# Patient Record
Sex: FEMALE | Race: AMERICAN INDIAN OR ALASKA NATIVE | NOT HISPANIC OR LATINO | Employment: FULL TIME | URBAN - METROPOLITAN AREA
[De-identification: names, ages, dates, MRNs, and addresses within clinical notes are randomized per-mention and may not be internally consistent; named-entity substitution may affect disease eponyms.]

---

## 2022-02-10 ENCOUNTER — OFFICE VISIT (OUTPATIENT)
Dept: INTERNAL MEDICINE CLINIC | Facility: CLINIC | Age: 42
End: 2022-02-10
Payer: COMMERCIAL

## 2022-02-10 VITALS
HEART RATE: 80 BPM | TEMPERATURE: 97.8 F | BODY MASS INDEX: 23.45 KG/M2 | WEIGHT: 127.4 LBS | HEIGHT: 62 IN | RESPIRATION RATE: 18 BRPM | SYSTOLIC BLOOD PRESSURE: 112 MMHG | DIASTOLIC BLOOD PRESSURE: 66 MMHG | OXYGEN SATURATION: 100 %

## 2022-02-10 DIAGNOSIS — E03.9 HYPOTHYROIDISM, UNSPECIFIED TYPE: ICD-10-CM

## 2022-02-10 DIAGNOSIS — N92.0 MENORRHAGIA WITH REGULAR CYCLE: Primary | ICD-10-CM

## 2022-02-10 DIAGNOSIS — D50.0 IRON DEFICIENCY ANEMIA DUE TO CHRONIC BLOOD LOSS: ICD-10-CM

## 2022-02-10 DIAGNOSIS — Z00.00 ROUTINE GENERAL MEDICAL EXAMINATION AT A HEALTH CARE FACILITY: ICD-10-CM

## 2022-02-10 DIAGNOSIS — E53.8 B12 DEFICIENCY: ICD-10-CM

## 2022-02-10 PROCEDURE — 3725F SCREEN DEPRESSION PERFORMED: CPT | Performed by: INTERNAL MEDICINE

## 2022-02-10 PROCEDURE — 1036F TOBACCO NON-USER: CPT | Performed by: INTERNAL MEDICINE

## 2022-02-10 PROCEDURE — 99385 PREV VISIT NEW AGE 18-39: CPT | Performed by: INTERNAL MEDICINE

## 2022-02-10 PROCEDURE — 3008F BODY MASS INDEX DOCD: CPT | Performed by: INTERNAL MEDICINE

## 2022-02-10 RX ORDER — DOXYCYCLINE HYCLATE 50 MG/1
324 CAPSULE, GELATIN COATED ORAL
COMMUNITY

## 2022-02-10 RX ORDER — LEVOTHYROXINE SODIUM 0.07 MG/1
75 TABLET ORAL DAILY
COMMUNITY
Start: 2021-12-12 | End: 2022-02-10 | Stop reason: SDUPTHER

## 2022-02-10 RX ORDER — CYANOCOBALAMIN 1000 UG/ML
INJECTION INTRAMUSCULAR; SUBCUTANEOUS
COMMUNITY
Start: 2021-12-21

## 2022-02-10 RX ORDER — LEVOTHYROXINE SODIUM 0.07 MG/1
75 TABLET ORAL DAILY
Qty: 90 TABLET | Refills: 1 | Status: SHIPPED | OUTPATIENT
Start: 2022-02-10

## 2022-02-10 NOTE — PROGRESS NOTES
INTERNAL MEDICINE OFFICE VISIT  [facility] Internal Medicine- [place]    NAME: Jolene Menjivar  AGE: 46 y o  SEX: female    DATE OF ENCOUNTER: 2/10/2022    Assessment and Giovani Bojorquez 14 comes in today to establish care/for annual physical     1  Menorrhagia with regular cycle  -endorses heavy menstruation, lasts 4-5 days, she frequently has to change pads  Her periods are regular  -she has followed with gynecology previously  She had a transvaginal ultrasound completed in January of 2020 which showed endometrial lesions, likely polyps rather than fibroids  There is also evidence of a left adnexal mass likely being a fibroid  -she is not currently on any oral contraceptives  -will refer to Gynecology    - Ambulatory Referral to Gynecology; Future    2  B12 deficiency  -likely due to dietary insufficiency  Had very low B12 levels last year and was started on injections  She is a vegetarian  She has been trying to incorporate more sources of B12 into her diet  -she has been receiving monthly B12 injections  -recheck B12 level  - Vitamin B12; Future    3  Iron deficiency anemia due to chronic blood loss  -likely secondary to chronic blood loss from menorrhagia as above  She states she had prior evaluation for celiac disease and thalassemia which was unremarkable  -Ferritin as low as 2 5 in April of 2021, improved to 28 4 in June after iron infusion  -she remains on iron supplementation  Takes ferrous gluconate daily  No issues tolerating this  -will recheck iron levels    - CBC and differential; Future  - Comprehensive metabolic panel; Future  - Lipid panel; Future  - Iron Panel (Includes Ferritin, Iron Sat%, Iron, and TIBC); Future    4  Hypothyroidism, unspecified type  -this was diagnosed during pregnancy  Her lab values have been stable on current dose of levothyroxine  -continue levothyroxine  Recheck TSH    - TSH, 3rd generation; Future  - T4, free; Future  - Vitamin D 25 hydroxy;  Future  - levothyroxine 75 mcg tablet; Take 1 tablet (75 mcg total) by mouth daily  Dispense: 90 tablet; Refill: 1     Hep C testing was negative in June of 2021, she also previously states she had HIV screening    Up-to-date on flu shot    's license paperwork is completed and signed today  No medical issues that would preclude her from driving      Depression Screening and Follow-up Plan: Patient was screened for depression during today's encounter  They screened negative with a PHQ-2 score of 0  Orders Placed This Encounter   Procedures    Vitamin B12    TSH, 3rd generation    T4, free    CBC and differential    Comprehensive metabolic panel    Lipid panel    Vitamin D 25 hydroxy    Ambulatory Referral to Gynecology       Chief Complaint     Chief Complaint   Patient presents with    [facility]     hep C /HIV/cervical cancer  screening /       History of Present Illness     She has a medical history of iron deficiency anemia, vitamin B12 deficiency, hypothyroidism    She is originally from [place], moved to [place] for 7 years  Her  works for [employer] and the family recently relocated to [facility] for his job  She has a 8year-old daughter who is doing well    Family history is remarkable for type 2 diabetes, cardiac bypass procedure in her father, hypothyroidism, hypertension in her mother  She has a 43-year-old sister who is in good health    She is a nonsmoker    Occasionally drinks red wine    The following portions of the patient's history were reviewed and updated as appropriate: allergies, current medications, past family history, past medical history, past social history, past surgical history and problem list     Review of Systems     10 point ROS negative except per HPI    Active Problem List     Patient Active Problem List   Diagnosis    Menorrhagia with regular cycle    B12 deficiency    Iron deficiency anemia due to chronic blood loss    Hypothyroidism       Objective     BP 112/66 (BP Location: Left arm, Patient Position: Sitting, Cuff Size: Standard)   Pulse 80   Temp 97 8 °F (36 6 °C)   Resp 18   Ht 5' 2" (1 575 m)   Wt 57 8 kg (127 lb 6 4 oz)   SpO2 100%   BMI 23 30 kg/m²     Physical Exam  Constitutional:       Appearance: Normal appearance  She is not ill-appearing  HENT:      Head: Normocephalic and atraumatic  Eyes:      General: No scleral icterus  Right eye: No discharge  Left eye: No discharge  Cardiovascular:      Rate and Rhythm: Normal rate and regular rhythm  Heart sounds: No murmur heard  No friction rub  Pulmonary:      Effort: Pulmonary effort is normal       Breath sounds: Normal breath sounds  No wheezing or rales  Abdominal:      General: Abdomen is flat  There is no distension  Palpations: Abdomen is soft  Tenderness: There is no abdominal tenderness  Musculoskeletal:         General: No swelling or tenderness  Skin:     General: Skin is warm and dry  Findings: No erythema  Neurological:      Mental Status: She is alert  Mental status is at baseline  Motor: No weakness  Psychiatric:         Mood and Affect: Mood normal          Behavior: Behavior normal          Pertinent Laboratory/Diagnostic Studies:  Patient was never admitted      Images and diagnostics reviewed     Current Medications     Current Outpatient Medications:     cyanocobalamin 1,000 mcg/mL, , Disp: , Rfl:     ferrous gluconate (FERGON) 324 mg tablet, Take 324 mg by mouth daily with breakfast, Disp: , Rfl:     levothyroxine 75 mcg tablet, Take 1 tablet (75 mcg total) by mouth daily, Disp: 90 tablet, Rfl: 1    Health Maintenance     Health Maintenance   Topic Date Due    Hepatitis C Screening  Never done    HIV Screening  Never done    Annual Physical  Never done    Cervical Cancer Screening  Never done    Influenza Vaccine (1) 09/01/2021    Depression Screening  02/10/2023    BMI: Adult  02/10/2023    DTaP,Tdap,and Td Vaccines (2 - Td or Tdap) 10/24/2026    COVID-19 Vaccine  Completed    Pneumococcal Vaccine: Pediatrics (0 to 5 Years) and At-Risk Patients (6 to 59 Years)  Aged Out    HIB Vaccine  Aged Out    Hepatitis B Vaccine  Aged Out    IPV Vaccine  Aged Out    Hepatitis A Vaccine  Aged Out    Meningococcal ACWY Vaccine  Aged Out    HPV Vaccine  Aged [employer] History   Administered Date(s) Administered    COVID-19 PFIZER VACCINE 0 3 ML IM 04/15/2021, 05/06/2021, 12/08/2021    INFLUENZA 10/29/2020    Influenza Quadrivalent 3 years and older 11/02/2016    Influenza Quadrivalent Preservative Free 3 years and older IM 11/22/2017, 11/07/2018, 11/12/2019    MMR 11/04/2016, 12/21/2016    Tdap 10/24/2016    Tuberculin Skin Test-PPD Intradermal 10/24/2016, 10/22/2018       MEL Petty  Internal Medicine [place] [id number], [facility] [address]  [place], [address]   Office: [phone]

## 2022-04-07 ENCOUNTER — OCCMED (OUTPATIENT)
Dept: URGENT CARE | Facility: CLINIC | Age: 42
End: 2022-04-07
Payer: COMMERCIAL

## 2022-04-07 ENCOUNTER — APPOINTMENT (OUTPATIENT)
Dept: LAB | Facility: CLINIC | Age: 42
End: 2022-04-07
Payer: COMMERCIAL

## 2022-04-07 DIAGNOSIS — Z02.1 PRE-EMPLOYMENT HEALTH SCREENING EXAMINATION: ICD-10-CM

## 2022-04-07 DIAGNOSIS — Z02.1 PRE-EMPLOYMENT HEALTH SCREENING EXAMINATION: Primary | ICD-10-CM

## 2022-04-07 LAB — RUBV IGG SERPL IA-ACNC: >175 IU/ML

## 2022-04-07 PROCEDURE — 86735 MUMPS ANTIBODY: CPT

## 2022-04-07 PROCEDURE — 86480 TB TEST CELL IMMUN MEASURE: CPT

## 2022-04-07 PROCEDURE — 86762 RUBELLA ANTIBODY: CPT

## 2022-04-07 PROCEDURE — 86787 VARICELLA-ZOSTER ANTIBODY: CPT

## 2022-04-07 PROCEDURE — 36415 COLL VENOUS BLD VENIPUNCTURE: CPT

## 2022-04-07 PROCEDURE — 86765 RUBEOLA ANTIBODY: CPT

## 2022-04-09 LAB — VZV IGG SER IA-ACNC: NORMAL

## 2022-04-10 LAB
GAMMA INTERFERON BACKGROUND BLD IA-ACNC: 0.04 IU/ML
M TB IFN-G BLD-IMP: NEGATIVE
M TB IFN-G CD4+ BCKGRND COR BLD-ACNC: -0.01 IU/ML
M TB IFN-G CD4+ BCKGRND COR BLD-ACNC: -0.02 IU/ML
MEV IGG SER QL: NORMAL
MITOGEN IGNF BCKGRD COR BLD-ACNC: 7.6 IU/ML
MUV IGG SER QL: NORMAL

## 2022-06-03 ENCOUNTER — APPOINTMENT (OUTPATIENT)
Dept: LAB | Facility: CLINIC | Age: 42
End: 2022-06-03
Payer: COMMERCIAL

## 2022-06-03 DIAGNOSIS — E53.8 B12 DEFICIENCY: ICD-10-CM

## 2022-06-03 DIAGNOSIS — E03.9 HYPOTHYROIDISM, UNSPECIFIED TYPE: ICD-10-CM

## 2022-06-03 DIAGNOSIS — D50.0 IRON DEFICIENCY ANEMIA DUE TO CHRONIC BLOOD LOSS: ICD-10-CM

## 2022-06-03 DIAGNOSIS — Z00.8 HEALTH EXAMINATION IN POPULATION SURVEY: ICD-10-CM

## 2022-06-03 LAB
25(OH)D3 SERPL-MCNC: 18.8 NG/ML (ref 30–100)
ALBUMIN SERPL BCP-MCNC: 4.1 G/DL (ref 3.5–5)
ALP SERPL-CCNC: 51 U/L (ref 46–116)
ALT SERPL W P-5'-P-CCNC: 21 U/L (ref 12–78)
ANION GAP SERPL CALCULATED.3IONS-SCNC: 8 MMOL/L (ref 4–13)
AST SERPL W P-5'-P-CCNC: 17 U/L (ref 5–45)
BASOPHILS # BLD AUTO: 0.02 THOUSANDS/ÂΜL (ref 0–0.1)
BASOPHILS NFR BLD AUTO: 0 % (ref 0–1)
BILIRUB SERPL-MCNC: 0.41 MG/DL (ref 0.2–1)
BUN SERPL-MCNC: 11 MG/DL (ref 5–25)
CALCIUM SERPL-MCNC: 9.4 MG/DL (ref 8.3–10.1)
CHLORIDE SERPL-SCNC: 107 MMOL/L (ref 100–108)
CHOLEST SERPL-MCNC: 185 MG/DL
CO2 SERPL-SCNC: 22 MMOL/L (ref 21–32)
CREAT SERPL-MCNC: 0.64 MG/DL (ref 0.6–1.3)
EOSINOPHIL # BLD AUTO: 0.14 THOUSAND/ÂΜL (ref 0–0.61)
EOSINOPHIL NFR BLD AUTO: 3 % (ref 0–6)
ERYTHROCYTE [DISTWIDTH] IN BLOOD BY AUTOMATED COUNT: 13.2 % (ref 11.6–15.1)
EST. AVERAGE GLUCOSE BLD GHB EST-MCNC: 91 MG/DL
FERRITIN SERPL-MCNC: 9 NG/ML (ref 8–388)
GFR SERPL CREATININE-BSD FRML MDRD: 112 ML/MIN/1.73SQ M
GLUCOSE P FAST SERPL-MCNC: 81 MG/DL (ref 65–99)
HBA1C MFR BLD: 4.8 %
HCT VFR BLD AUTO: 40.6 % (ref 34.8–46.1)
HDLC SERPL-MCNC: 65 MG/DL
HGB BLD-MCNC: 12.8 G/DL (ref 11.5–15.4)
IMM GRANULOCYTES # BLD AUTO: 0.01 THOUSAND/UL (ref 0–0.2)
IMM GRANULOCYTES NFR BLD AUTO: 0 % (ref 0–2)
IRON SATN MFR SERPL: 14 % (ref 15–50)
IRON SERPL-MCNC: 55 UG/DL (ref 50–170)
LDLC SERPL CALC-MCNC: 110 MG/DL (ref 0–100)
LYMPHOCYTES # BLD AUTO: 1.07 THOUSANDS/ÂΜL (ref 0.6–4.47)
LYMPHOCYTES NFR BLD AUTO: 22 % (ref 14–44)
MCH RBC QN AUTO: 28.1 PG (ref 26.8–34.3)
MCHC RBC AUTO-ENTMCNC: 31.5 G/DL (ref 31.4–37.4)
MCV RBC AUTO: 89 FL (ref 82–98)
MONOCYTES # BLD AUTO: 0.26 THOUSAND/ÂΜL (ref 0.17–1.22)
MONOCYTES NFR BLD AUTO: 5 % (ref 4–12)
NEUTROPHILS # BLD AUTO: 3.36 THOUSANDS/ÂΜL (ref 1.85–7.62)
NEUTS SEG NFR BLD AUTO: 70 % (ref 43–75)
NONHDLC SERPL-MCNC: 120 MG/DL
NRBC BLD AUTO-RTO: 0 /100 WBCS
PLATELET # BLD AUTO: 256 THOUSANDS/UL (ref 149–390)
PMV BLD AUTO: 11.2 FL (ref 8.9–12.7)
POTASSIUM SERPL-SCNC: 4.2 MMOL/L (ref 3.5–5.3)
PROT SERPL-MCNC: 7.8 G/DL (ref 6.4–8.2)
RBC # BLD AUTO: 4.56 MILLION/UL (ref 3.81–5.12)
SODIUM SERPL-SCNC: 137 MMOL/L (ref 136–145)
T4 FREE SERPL-MCNC: 1.1 NG/DL (ref 0.76–1.46)
TIBC SERPL-MCNC: 387 UG/DL (ref 250–450)
TRIGL SERPL-MCNC: 51 MG/DL
TSH SERPL DL<=0.05 MIU/L-ACNC: 1.13 UIU/ML (ref 0.45–4.5)
VIT B12 SERPL-MCNC: 449 PG/ML (ref 100–900)
WBC # BLD AUTO: 4.86 THOUSAND/UL (ref 4.31–10.16)

## 2022-06-03 PROCEDURE — 85025 COMPLETE CBC W/AUTO DIFF WBC: CPT

## 2022-06-03 PROCEDURE — 36415 COLL VENOUS BLD VENIPUNCTURE: CPT

## 2022-06-03 PROCEDURE — 82728 ASSAY OF FERRITIN: CPT

## 2022-06-03 PROCEDURE — 84439 ASSAY OF FREE THYROXINE: CPT

## 2022-06-03 PROCEDURE — 80053 COMPREHEN METABOLIC PANEL: CPT

## 2022-06-03 PROCEDURE — 82306 VITAMIN D 25 HYDROXY: CPT

## 2022-06-03 PROCEDURE — 83036 HEMOGLOBIN GLYCOSYLATED A1C: CPT

## 2022-06-03 PROCEDURE — 83540 ASSAY OF IRON: CPT

## 2022-06-03 PROCEDURE — 84443 ASSAY THYROID STIM HORMONE: CPT

## 2022-06-03 PROCEDURE — 80061 LIPID PANEL: CPT

## 2022-06-03 PROCEDURE — 83550 IRON BINDING TEST: CPT

## 2022-06-03 PROCEDURE — 82607 VITAMIN B-12: CPT

## 2022-08-14 ENCOUNTER — PATIENT MESSAGE (OUTPATIENT)
Dept: INTERNAL MEDICINE CLINIC | Facility: CLINIC | Age: 42
End: 2022-08-14

## 2022-08-14 DIAGNOSIS — D50.0 IRON DEFICIENCY ANEMIA DUE TO CHRONIC BLOOD LOSS: Primary | ICD-10-CM

## 2022-08-15 RX ORDER — SODIUM CHLORIDE 9 MG/ML
20 INJECTION, SOLUTION INTRAVENOUS ONCE
Status: CANCELLED | OUTPATIENT
Start: 2022-08-15

## 2022-08-16 NOTE — TELEPHONE ENCOUNTER
From: Gabby Ribeiro  To: Marco Small, DO  Sent: 8/14/2022 9:53 PM EDT  Subject: Regarding [address],     Massiel Cifuentes have suggested me to go for iron infusion based on my test results  Can you please put in the referral for the same, so that I can book my appointments       Thank you  [employer]

## 2022-08-24 ENCOUNTER — PATIENT MESSAGE (OUTPATIENT)
Dept: INTERNAL MEDICINE CLINIC | Facility: CLINIC | Age: 42
End: 2022-08-24

## 2022-08-24 DIAGNOSIS — D50.0 IRON DEFICIENCY ANEMIA DUE TO CHRONIC BLOOD LOSS: Primary | ICD-10-CM

## 2022-08-25 RX ORDER — SODIUM CHLORIDE 9 MG/ML
20 INJECTION, SOLUTION INTRAVENOUS ONCE
Status: CANCELLED | OUTPATIENT
Start: 2022-08-25

## 2022-08-25 NOTE — TELEPHONE ENCOUNTER
From: Joesph Nicholson  To: Marco Clark, DO  Sent: 8/14/2022 9:53 PM EDT  Subject: Regarding [address] have suggested me to go for iron infusion based on my test results  Can you please put in the referral for the same, so that I can book my appointments       Thank you  [employer]

## 2022-09-19 DIAGNOSIS — D50.0 IRON DEFICIENCY ANEMIA DUE TO CHRONIC BLOOD LOSS: Primary | ICD-10-CM

## 2022-09-19 RX ORDER — SODIUM CHLORIDE 9 MG/ML
20 INJECTION, SOLUTION INTRAVENOUS ONCE
Status: CANCELLED | OUTPATIENT
Start: 2022-09-21

## 2022-09-20 ENCOUNTER — TELEPHONE (OUTPATIENT)
Dept: INTERNAL MEDICINE CLINIC | Facility: CLINIC | Age: 42
End: 2022-09-20

## 2022-09-20 NOTE — TELEPHONE ENCOUNTER
Called pt left message to call back and to cancel her appt for infusion it has not been approved so we need to reschedule this appt        Spoke to Reaction from pre encounter told her that we are waiting for approval of the infusion

## 2022-09-21 ENCOUNTER — HOSPITAL ENCOUNTER (OUTPATIENT)
Dept: INFUSION CENTER | Facility: HOSPITAL | Age: 42
Discharge: HOME | End: 2022-09-21

## 2022-09-22 DIAGNOSIS — D50.0 IRON DEFICIENCY ANEMIA DUE TO CHRONIC BLOOD LOSS: Primary | ICD-10-CM

## 2022-09-22 RX ORDER — SODIUM CHLORIDE 9 MG/ML
20 INJECTION, SOLUTION INTRAVENOUS ONCE
Status: CANCELLED | OUTPATIENT
Start: 2022-10-05

## 2022-09-27 ENCOUNTER — TELEPHONE (OUTPATIENT)
Dept: INTERNAL MEDICINE CLINIC | Facility: CLINIC | Age: 42
End: 2022-09-27

## 2022-09-27 NOTE — TELEPHONE ENCOUNTER
FOR PRIOR AUTH [employer] INSURANCE:    Tel- [phone] opt #[phone]    Fax# [phone]    INFUSION CENTER:   [facility] CPT: Jumana Velazquez CPT:

## 2022-09-28 ENCOUNTER — HOSPITAL ENCOUNTER (OUTPATIENT)
Dept: INFUSION CENTER | Facility: HOSPITAL | Age: 42
End: 2022-09-28

## 2022-10-05 ENCOUNTER — HOSPITAL ENCOUNTER (OUTPATIENT)
Dept: INFUSION CENTER | Facility: HOSPITAL | Age: 42
Discharge: HOME | End: 2022-10-05
Payer: COMMERCIAL

## 2022-10-05 VITALS — TEMPERATURE: 96.8 F | RESPIRATION RATE: 12 BRPM | HEART RATE: 72 BPM | OXYGEN SATURATION: 100 %

## 2022-10-05 DIAGNOSIS — D50.0 IRON DEFICIENCY ANEMIA DUE TO CHRONIC BLOOD LOSS: Primary | ICD-10-CM

## 2022-10-05 PROCEDURE — 96365 THER/PROPH/DIAG IV INF INIT: CPT

## 2022-10-05 RX ORDER — SODIUM CHLORIDE 9 MG/ML
20 INJECTION, SOLUTION INTRAVENOUS ONCE
Status: CANCELLED | OUTPATIENT
Start: 2022-10-12

## 2022-10-05 RX ORDER — SODIUM CHLORIDE 9 MG/ML
20 INJECTION, SOLUTION INTRAVENOUS ONCE
Status: COMPLETED | OUTPATIENT
Start: 2022-10-05 | End: 2022-10-05

## 2022-10-05 RX ADMIN — IRON SUCROSE 200 MG: 20 INJECTION, SOLUTION INTRAVENOUS at 14:51

## 2022-10-05 RX ADMIN — SODIUM CHLORIDE 20 ML/HR: 9 INJECTION, SOLUTION INTRAVENOUS at 14:53

## 2022-10-05 NOTE — PROGRESS NOTES
Pt tolerated IV Venofer infusion without adverse reaction  Pt left unit ambulatory with steady gait    Pt refused AVS

## 2022-10-12 ENCOUNTER — HOSPITAL ENCOUNTER (OUTPATIENT)
Dept: INFUSION CENTER | Facility: HOSPITAL | Age: 42
Discharge: HOME | End: 2022-10-12
Payer: COMMERCIAL

## 2022-10-12 VITALS
TEMPERATURE: 97.2 F | RESPIRATION RATE: 14 BRPM | SYSTOLIC BLOOD PRESSURE: 107 MMHG | HEART RATE: 65 BPM | DIASTOLIC BLOOD PRESSURE: 65 MMHG | OXYGEN SATURATION: 100 %

## 2022-10-12 DIAGNOSIS — D50.0 IRON DEFICIENCY ANEMIA DUE TO CHRONIC BLOOD LOSS: Primary | ICD-10-CM

## 2022-10-12 PROCEDURE — 96365 THER/PROPH/DIAG IV INF INIT: CPT

## 2022-10-12 RX ORDER — SODIUM CHLORIDE 9 MG/ML
20 INJECTION, SOLUTION INTRAVENOUS ONCE
Status: COMPLETED | OUTPATIENT
Start: 2022-10-12 | End: 2022-10-12

## 2022-10-12 RX ORDER — SODIUM CHLORIDE 9 MG/ML
20 INJECTION, SOLUTION INTRAVENOUS ONCE
Status: CANCELLED | OUTPATIENT
Start: 2022-10-19

## 2022-10-12 RX ADMIN — SODIUM CHLORIDE 20 ML/HR: 9 INJECTION, SOLUTION INTRAVENOUS at 10:25

## 2022-10-12 RX ADMIN — IRON SUCROSE 200 MG: 20 INJECTION, SOLUTION INTRAVENOUS at 10:30

## 2022-10-12 NOTE — PROGRESS NOTES
Pt [place] Venofer infusion w/o Ar seen  PIV removed intact, pressure dssg applied  Pt has one more appt  Disch amb to home with spouse, steady gait

## 2022-10-20 ENCOUNTER — HOSPITAL ENCOUNTER (OUTPATIENT)
Dept: INFUSION CENTER | Facility: HOSPITAL | Age: 42
Discharge: HOME | End: 2022-10-20
Payer: COMMERCIAL

## 2022-10-20 VITALS
OXYGEN SATURATION: 100 % | SYSTOLIC BLOOD PRESSURE: 100 MMHG | RESPIRATION RATE: 14 BRPM | TEMPERATURE: 97.3 F | HEART RATE: 68 BPM | DIASTOLIC BLOOD PRESSURE: 59 MMHG

## 2022-10-20 DIAGNOSIS — D50.0 IRON DEFICIENCY ANEMIA DUE TO CHRONIC BLOOD LOSS: Primary | ICD-10-CM

## 2022-10-20 PROCEDURE — 96365 THER/PROPH/DIAG IV INF INIT: CPT

## 2022-10-20 RX ORDER — SODIUM CHLORIDE 9 MG/ML
20 INJECTION, SOLUTION INTRAVENOUS ONCE
Status: COMPLETED | OUTPATIENT
Start: 2022-10-20 | End: 2022-10-20

## 2022-10-20 RX ORDER — SODIUM CHLORIDE 9 MG/ML
20 INJECTION, SOLUTION INTRAVENOUS ONCE
Status: CANCELLED | OUTPATIENT
Start: 2022-10-20

## 2022-10-20 RX ADMIN — SODIUM CHLORIDE 20 ML/HR: 9 INJECTION, SOLUTION INTRAVENOUS at 09:31

## 2022-10-20 RX ADMIN — IRON SUCROSE 200 MG: 20 INJECTION, SOLUTION INTRAVENOUS at 09:32

## 2022-10-20 NOTE — PROGRESS NOTES
Pt d[place] last Venofer infusion w/o Ar seen  PIV removed intact, pressure dssg applied  Disch amb to home with spouse, steady gait

## 2022-10-20 NOTE — PLAN OF CARE
Problem: Knowledge Deficit  Goal: Patient/family/caregiver demonstrates understanding of disease process, treatment plan, medications, and discharge instructions  Description: Complete learning assessment and assess knowledge base    Interventions:  - Provide teaching at level of understanding  - Provide teaching via preferred learning methods  10/20/2022 0928 by Hue Will RN  Outcome: Progressing  10/20/2022 0919 by Hue Will RN  Outcome: Progressing

## 2023-01-30 ENCOUNTER — HOSPITAL ENCOUNTER (OUTPATIENT)
Dept: RADIOLOGY | Age: 43
Discharge: HOME | End: 2023-01-30

## 2023-01-30 ENCOUNTER — TELEPHONE (OUTPATIENT)
Dept: INTERNAL MEDICINE CLINIC | Facility: CLINIC | Age: 43
End: 2023-01-30

## 2023-01-30 DIAGNOSIS — R10.31 ACUTE RIGHT LOWER QUADRANT PAIN: Primary | ICD-10-CM

## 2023-01-30 DIAGNOSIS — R10.31 ACUTE RIGHT LOWER QUADRANT PAIN: ICD-10-CM

## 2023-01-30 RX ADMIN — IOHEXOL 100 ML: 350 INJECTION, SOLUTION INTRAVENOUS at 15:52

## 2023-01-30 NOTE — TELEPHONE ENCOUNTER
Results were discussed with patient over the phone  No acute pathology seen  There is no evidence of bowel obstruction, hernia, appendicitis, obstructive uropathy, free air  Other findings possibly consistent with fibroid/leiomyoma as well as left-sided ovarian follicles/cysts  Pelvic ultrasound completed in January 2020 did show endometrial lesions, likely polyps versus fibroids and left adnexal mass  Findings on current CT appear to be stable in comparison to this prior ultrasound  All patient questions answered    Follow-up appointment is arranged for later this week

## 2023-02-01 ENCOUNTER — OFFICE VISIT (OUTPATIENT)
Dept: INTERNAL MEDICINE CLINIC | Facility: CLINIC | Age: 43
End: 2023-02-01

## 2023-02-01 VITALS
RESPIRATION RATE: 16 BRPM | SYSTOLIC BLOOD PRESSURE: 112 MMHG | TEMPERATURE: 97.5 F | HEIGHT: 62 IN | WEIGHT: 117 LBS | DIASTOLIC BLOOD PRESSURE: 62 MMHG | OXYGEN SATURATION: 100 % | HEART RATE: 101 BPM | BODY MASS INDEX: 21.53 KG/M2

## 2023-02-01 DIAGNOSIS — N80.9 ENDOMETRIOSIS: ICD-10-CM

## 2023-02-01 DIAGNOSIS — D50.0 IRON DEFICIENCY ANEMIA DUE TO CHRONIC BLOOD LOSS: ICD-10-CM

## 2023-02-01 DIAGNOSIS — N92.0 MENORRHAGIA WITH REGULAR CYCLE: ICD-10-CM

## 2023-02-01 DIAGNOSIS — E53.8 B12 DEFICIENCY: ICD-10-CM

## 2023-02-01 DIAGNOSIS — E03.9 HYPOTHYROIDISM, UNSPECIFIED TYPE: ICD-10-CM

## 2023-02-01 DIAGNOSIS — R10.31 RIGHT LOWER QUADRANT PAIN: Primary | ICD-10-CM

## 2023-02-01 NOTE — PROGRESS NOTES
INTERNAL MEDICINE OFFICE VISIT  [facility] Internal Medicine- [place]    NAME: Jesus Zamudio  AGE: 38 y o  SEX: female    DATE OF ENCOUNTER: 2023    Assessment and Plan/History of Present Illness     Here today for evaluation of abdominal pain  Medical history of menorrhagia, endometrial fibroids versus polyp, ovarian cyst, B12 deficiency, iron deficiency, hypothyroidism    Was initially contacted by family member of the patient who is a physician  Was alerted that the patient developed sudden onset right lower quadrant abdominal pain on   No associated nausea, vomiting, diarrhea, changes in urinary habits, recent heavy lifting  She had associated tenderness over her  section scar located in the right pelvic area  She describes swelling in the area of her scar about the size of her finger at the time  Pain and swelling did resolve after approximately 1 hour  She took Motrin which did help with the pain  CT of the abdomen/pelvis with contrast was obtained to evaluate for incisional hernia, appendicitis, etc   CT was unrevealing for any acute pathology  There is finding of possible uterine fibroid/leiomyoma and left-sided ovarian follicle/cyst    Since her episode on , she has had intermittent discomfort in the same area of her abdomen located in the right lower quadrant  She does have a longstanding history of menorrhagia with painful periods, clotting, as well as endometriosis  Her FDLMP is   Her periods typically last 4 to 5 days and she frequently has to change pads for the first several days of her period  She is not yet established with gynecology in the area    She completed a pelvic ultrasound in 2020 which showed endometrial lesions, likely polyps versus fibroids as well as left adnexal mass, likely exophytic fibroid    1  Right lower quadrant pain  -Etiology of discomfort unclear    Possibly related to fat or bowel herniation with spontaneous reduction versus recurrence of endometriosis? Pain may be more likely of gynecologic origin  -Recommend patient reestablish with gynecology  Referral ordered today  -If her pain recurs, recommend managing conservatively for now with NSAIDs as needed, heating pad  -She previously underwent iron infusions for iron deficiency  We will recheck CBC, iron panel to reevaluate for anemia    - Ambulatory Referral to Gynecology; Future    2  Endometriosis  - Ambulatory Referral to Gynecology; Future    3  B12 deficiency  - Vitamin B12; Future    4  Menorrhagia with regular cycle    5  Hypothyroidism, unspecified type  - CBC and differential; Future  - Comprehensive metabolic panel; Future  - Lipid panel; Future  - T4, free; Future  - TSH, 3rd generation; Future    6  Iron deficiency anemia due to chronic blood loss    - Iron Panel (Includes Ferritin, Iron Sat%, Iron, and TIBC);  Future             Orders Placed This Encounter   Procedures   • CBC and differential   • Comprehensive metabolic panel   • Lipid panel   • T4, free   • TSH, 3rd generation   • Vitamin B12   • Ambulatory Referral to Gynecology       Chief Complaint     Chief Complaint   Patient presents with   • Abdominal Pain     Abdomen pain left lower area on Sunday to present        Review of Systems     10 point ROS negative except per HPI    The following portions of the patient's history were reviewed and updated as appropriate: allergies, current medications, past family history, past medical history, past social history, past surgical history and problem list     Active Problem List     Patient Active Problem List   Diagnosis   • Menorrhagia with regular cycle   • B12 deficiency   • Iron deficiency anemia due to chronic blood loss   • Hypothyroidism       Objective     /62 (BP Location: Left arm, Patient Position: Sitting, Cuff Size: Standard)   Pulse 101   Temp 97 5 °F (36 4 °C)   Resp 16   Ht 5' 2" (1 575 m)   Wt 53 1 kg (117 lb)   SpO2 100% BMI 21 40 kg/m²     Physical Exam  Constitutional:       Appearance: Normal appearance  She is not ill-appearing  HENT:      Head: Normocephalic and atraumatic  Eyes:      General: No scleral icterus  Right eye: No discharge  Left eye: No discharge  Cardiovascular:      Rate and Rhythm: Normal rate and regular rhythm  Heart sounds: No murmur heard  No friction rub  Pulmonary:      Effort: Pulmonary effort is normal       Breath sounds: Normal breath sounds  No wheezing or rales  Abdominal:      General: Abdomen is flat  Bowel sounds are normal  There is no distension  Palpations: Abdomen is soft  There is no shifting dullness, mass or pulsatile mass  Tenderness: There is no abdominal tenderness  Comments: Right lower quadrant scar   Musculoskeletal:         General: No swelling or tenderness  Skin:     General: Skin is warm and dry  Findings: No erythema  Neurological:      Mental Status: She is alert  Mental status is at baseline  Motor: No weakness  Psychiatric:         Mood and Affect: Mood normal          Behavior: Behavior normal          Pertinent Laboratory/Diagnostic Studies:  CT abdomen pelvis w contrast    Result Date: 1/30/2023  Impression: 1  Mild heterogeneous uterine enhancement with a left posterior exophytic uterine mass noted  This could be a fibroid/leiomyoma  Small hypodense foci in the left adnexal region may represent ovarian follicles/cysts  Trace pelvic ascites is seen  If clinically indicated, consider pelvic ultrasound for better evaluation of gynecological structures  2   No findings of bowel obstruction, inflammation, appendicitis, obstructive uropathy, or free air   Workstation performed: [id number]       Images and diagnostics reviewed     Current Medications     Current Outpatient Medications:   •  levothyroxine 75 mcg tablet, Take 1 tablet (75 mcg total) by mouth daily, Disp: 90 tablet, Rfl: 1  •  cyanocobalamin 1,000 mcg/mL, , Disp: , Rfl:   •  ferrous gluconate (FERGON) 324 mg tablet, Take 324 mg by mouth daily with breakfast (Patient not taking: Reported on 2/1/2023), Disp: , Rfl:     Health Maintenance     Health Maintenance   Topic Date Due   • Hepatitis C Screening  Never done   • HIV Screening  Never done   • Cervical Cancer Screening  Never done   • COVID-19 Vaccine (4 - Booster for Montoya Peter series) 02/02/2022   • Breast Cancer Screening: Mammogram  Never done   • Depression Screening  02/10/2023   • Annual Physical  02/10/2023   • BMI: Adult  02/01/2024   • DTaP,Tdap,and Td Vaccines (2 - Td or Tdap) 10/24/2026   • Influenza Vaccine  Completed   • Pneumococcal Vaccine: Pediatrics (0 to 5 Years) and At-Risk Patients (6 to 59 Years)  Aged Out   • HIB Vaccine  Aged Out   • IPV Vaccine  Aged Out   • Hepatitis A Vaccine  Aged Out   • Meningococcal ACWY Vaccine  Aged Out   • HPV Vaccine  Aged [employer] History   Administered Date(s) Administered   • COVID-19 PFIZER VACCINE 0 3 ML IM 04/15/2021, 05/06/2021, 12/08/2021   • INFLUENZA 10/29/2020, 11/28/2022   • Influenza Quadrivalent 3 years and older 11/02/2016   • Influenza Quadrivalent Preservative Free 3 years and older IM 11/22/2017, 11/07/2018, 11/12/2019   • MMR 11/04/2016, 12/21/2016   • Tdap 10/24/2016   • Tuberculin Skin Test-PPD Intradermal 10/24/2016, 10/22/2018       MEL Garnicaer Internal Medicine [address], [facility] [address]  [place], [address]   Office: [phone]  Fax: [phone]

## 2023-03-03 ENCOUNTER — OFFICE VISIT (OUTPATIENT)
Dept: OBGYN CLINIC | Facility: MEDICAL CENTER | Age: 43
End: 2023-03-03

## 2023-03-03 VITALS
DIASTOLIC BLOOD PRESSURE: 64 MMHG | SYSTOLIC BLOOD PRESSURE: 108 MMHG | WEIGHT: 117.3 LBS | BODY MASS INDEX: 21.59 KG/M2 | HEIGHT: 62 IN

## 2023-03-03 DIAGNOSIS — D25.2 SUBSEROSAL LEIOMYOMA OF UTERUS: ICD-10-CM

## 2023-03-03 DIAGNOSIS — N80.9 ENDOMETRIOSIS: ICD-10-CM

## 2023-03-03 DIAGNOSIS — R10.31 RIGHT LOWER QUADRANT PAIN: ICD-10-CM

## 2023-03-03 DIAGNOSIS — N80.C19 ENDOMETRIOSIS OF ANTERIOR ABDOMINAL WALL: Primary | ICD-10-CM

## 2023-03-03 NOTE — PROGRESS NOTES
[facility] [address] [place] [address]    Assessment/Plan:  Sandra Phelps is a 38 y o  Justine Zach with history of  delivery in  and excision of incisional endometrioma in 2017 who presents with similar symptoms of intermittent abdominal wall painful palpable mass along the right aspect of the  incision  Additionally with CT showing exophytic uterine fibroid  Recommend a pelvic MRI to further characterize, but suspect abdominal wall endometrioma  No problem-specific Assessment & Plan notes found for this encounter  Diagnoses and all orders for this visit:    Endometriosis of anterior abdominal wall  -     MRI pelvis female wo and w contrast; Future    Subserosal leiomyoma of uterus  -     MRI pelvis female wo and w contrast; Future    Right lower quadrant pain  -     Ambulatory Referral to Gynecology    Endometriosis  -     Ambulatory Referral to Gynecology          Subjective:   Sandra Phelps is a 38 y o  Justine Serrano female  CC: abdominal pain    HPI: Tran Ramirez presents as a new patient referred for abdominal wall pain and swelling along the right aspect of her pfannensteil incision from  delivery  She has a history of endometrioma excision from her abdominal wall on the LEFT aspect of her incision in [place] in 2017 - pathology confirmed endometrioma  She started developing symptoms two months ago  The pain and swelling comes on suddenly  It improves with heat and ibuprofen  The pain is severe and inhibits other activities  She reports a history of dysmenorrhea but has avoided hormonal contraception in the past       ROS: Review of Systems   Constitutional: Negative for chills and fever  Respiratory: Negative for cough and shortness of breath  Cardiovascular: Negative for chest pain and leg swelling  Gastrointestinal: Negative for abdominal pain, nausea and vomiting  Genitourinary: Negative for dysuria, frequency and urgency  Neurological: Negative for dizziness, light-headedness and headaches  PFSH: The following portions of the patient's history were reviewed and updated as appropriate: allergies, current medications, past family history, past medical history, obstetric history, gynecologic history, past social history, past surgical history and problem list        Objective:  /64   Ht 5' 2" (1 575 m)   Wt 53 2 kg (117 lb 4 8 oz)   LMP 02/11/2023 (Exact Date)   BMI 21 45 kg/m²    Physical Exam  Constitutional:       Appearance: Normal appearance  HENT:      Head: Normocephalic and atraumatic  Mouth/Throat:      Mouth: Mucous membranes are moist       Pharynx: Oropharynx is clear  No oropharyngeal exudate  Cardiovascular:      Rate and Rhythm: Normal rate  Pulmonary:      Effort: Pulmonary effort is normal    Abdominal:      General: Abdomen is flat  There is no distension  Palpations: Abdomen is soft  There is no mass  Tenderness: There is no abdominal tenderness  Hernia: No hernia is present  Comments: Palpable swelling in the subcutaneous tissue on the right aspect of the prior pfannensteil incision  Skin:     General: Skin is warm and dry  Neurological:      General: No focal deficit present  Mental Status: She is alert and oriented to person, place, and time     Psychiatric:         Mood and Affect: Mood normal          Behavior: Behavior normal            Jeanette Nix MD  [address] [place] [address]  3/3/2023 12:59 PM

## 2023-06-08 ENCOUNTER — APPOINTMENT (OUTPATIENT)
Dept: LAB | Facility: CLINIC | Age: 43
End: 2023-06-08
Payer: COMMERCIAL

## 2023-06-08 DIAGNOSIS — E53.8 B12 DEFICIENCY: ICD-10-CM

## 2023-06-08 DIAGNOSIS — E03.9 HYPOTHYROIDISM, UNSPECIFIED TYPE: ICD-10-CM

## 2023-06-08 DIAGNOSIS — D50.0 IRON DEFICIENCY ANEMIA DUE TO CHRONIC BLOOD LOSS: ICD-10-CM

## 2023-06-08 LAB
ALBUMIN SERPL BCP-MCNC: 3.9 G/DL (ref 3.5–5)
ALP SERPL-CCNC: 47 U/L (ref 46–116)
ALT SERPL W P-5'-P-CCNC: 16 U/L (ref 12–78)
ANION GAP SERPL CALCULATED.3IONS-SCNC: 0 MMOL/L (ref 4–13)
AST SERPL W P-5'-P-CCNC: 15 U/L (ref 5–45)
BASOPHILS # BLD AUTO: 0.03 THOUSANDS/ÂΜL (ref 0–0.1)
BASOPHILS NFR BLD AUTO: 1 % (ref 0–1)
BILIRUB SERPL-MCNC: 0.5 MG/DL (ref 0.2–1)
BUN SERPL-MCNC: 11 MG/DL (ref 5–25)
CALCIUM SERPL-MCNC: 9.1 MG/DL (ref 8.3–10.1)
CHLORIDE SERPL-SCNC: 112 MMOL/L (ref 96–108)
CHOLEST SERPL-MCNC: 181 MG/DL
CO2 SERPL-SCNC: 25 MMOL/L (ref 21–32)
CREAT SERPL-MCNC: 0.59 MG/DL (ref 0.6–1.3)
EOSINOPHIL # BLD AUTO: 0.18 THOUSAND/ÂΜL (ref 0–0.61)
EOSINOPHIL NFR BLD AUTO: 5 % (ref 0–6)
ERYTHROCYTE [DISTWIDTH] IN BLOOD BY AUTOMATED COUNT: 13.3 % (ref 11.6–15.1)
FERRITIN SERPL-MCNC: 5 NG/ML (ref 11–307)
GFR SERPL CREATININE-BSD FRML MDRD: 115 ML/MIN/1.73SQ M
GLUCOSE P FAST SERPL-MCNC: 90 MG/DL (ref 65–99)
HCT VFR BLD AUTO: 35 % (ref 34.8–46.1)
HDLC SERPL-MCNC: 74 MG/DL
HGB BLD-MCNC: 10.6 G/DL (ref 11.5–15.4)
IMM GRANULOCYTES # BLD AUTO: 0.01 THOUSAND/UL (ref 0–0.2)
IMM GRANULOCYTES NFR BLD AUTO: 0 % (ref 0–2)
IRON SATN MFR SERPL: 9 % (ref 15–50)
IRON SERPL-MCNC: 39 UG/DL (ref 50–170)
LDLC SERPL CALC-MCNC: 96 MG/DL (ref 0–100)
LYMPHOCYTES # BLD AUTO: 0.92 THOUSANDS/ÂΜL (ref 0.6–4.47)
LYMPHOCYTES NFR BLD AUTO: 26 % (ref 14–44)
MCH RBC QN AUTO: 25.1 PG (ref 26.8–34.3)
MCHC RBC AUTO-ENTMCNC: 30.3 G/DL (ref 31.4–37.4)
MCV RBC AUTO: 83 FL (ref 82–98)
MONOCYTES # BLD AUTO: 0.32 THOUSAND/ÂΜL (ref 0.17–1.22)
MONOCYTES NFR BLD AUTO: 9 % (ref 4–12)
NEUTROPHILS # BLD AUTO: 2.03 THOUSANDS/ÂΜL (ref 1.85–7.62)
NEUTS SEG NFR BLD AUTO: 59 % (ref 43–75)
NONHDLC SERPL-MCNC: 107 MG/DL
NRBC BLD AUTO-RTO: 0 /100 WBCS
PLATELET # BLD AUTO: 285 THOUSANDS/UL (ref 149–390)
PMV BLD AUTO: 10.1 FL (ref 8.9–12.7)
POTASSIUM SERPL-SCNC: 4.5 MMOL/L (ref 3.5–5.3)
PROT SERPL-MCNC: 7.3 G/DL (ref 6.4–8.4)
RBC # BLD AUTO: 4.23 MILLION/UL (ref 3.81–5.12)
SODIUM SERPL-SCNC: 137 MMOL/L (ref 135–147)
T4 FREE SERPL-MCNC: 0.9 NG/DL (ref 0.61–1.12)
TIBC SERPL-MCNC: 422 UG/DL (ref 250–450)
TRIGL SERPL-MCNC: 53 MG/DL
TSH SERPL DL<=0.05 MIU/L-ACNC: 3.47 UIU/ML (ref 0.45–4.5)
VIT B12 SERPL-MCNC: 349 PG/ML (ref 180–914)
WBC # BLD AUTO: 3.49 THOUSAND/UL (ref 4.31–10.16)

## 2023-06-08 PROCEDURE — 36415 COLL VENOUS BLD VENIPUNCTURE: CPT

## 2023-06-08 PROCEDURE — 80053 COMPREHEN METABOLIC PANEL: CPT

## 2023-06-08 PROCEDURE — 84443 ASSAY THYROID STIM HORMONE: CPT

## 2023-06-08 PROCEDURE — 82728 ASSAY OF FERRITIN: CPT

## 2023-06-08 PROCEDURE — 83550 IRON BINDING TEST: CPT

## 2023-06-08 PROCEDURE — 85025 COMPLETE CBC W/AUTO DIFF WBC: CPT

## 2023-06-08 PROCEDURE — 83540 ASSAY OF IRON: CPT

## 2023-06-08 PROCEDURE — 82607 VITAMIN B-12: CPT

## 2023-06-08 PROCEDURE — 80061 LIPID PANEL: CPT

## 2023-06-08 PROCEDURE — 84439 ASSAY OF FREE THYROXINE: CPT

## 2023-06-19 DIAGNOSIS — D50.0 IRON DEFICIENCY ANEMIA DUE TO CHRONIC BLOOD LOSS: Primary | ICD-10-CM

## 2023-06-19 RX ORDER — SODIUM CHLORIDE 9 MG/ML
20 INJECTION, SOLUTION INTRAVENOUS ONCE
OUTPATIENT
Start: 2023-07-05

## 2023-07-06 ENCOUNTER — TELEPHONE (OUTPATIENT)
Dept: INTERNAL MEDICINE CLINIC | Facility: CLINIC | Age: 43
End: 2023-07-06

## 2023-07-06 NOTE — TELEPHONE ENCOUNTER
Stu Keller from [facility] called she stated that the pt labs were coded wrong I stated that the pt needs an appt that the pt has not been seen at the office for an annual visit he stated that she will be cailling the pt

## 2023-08-06 ENCOUNTER — PATIENT MESSAGE (OUTPATIENT)
Dept: INTERNAL MEDICINE CLINIC | Facility: CLINIC | Age: 43
End: 2023-08-06

## 2023-08-06 DIAGNOSIS — D50.0 IRON DEFICIENCY ANEMIA DUE TO CHRONIC BLOOD LOSS: Primary | ICD-10-CM

## 2023-08-07 RX ORDER — SODIUM CHLORIDE 9 MG/ML
20 INJECTION, SOLUTION INTRAVENOUS ONCE
Status: CANCELLED | OUTPATIENT
Start: 2023-08-17

## 2023-08-07 NOTE — TELEPHONE ENCOUNTER
From: Oscar Pennington  To: Marco Franco  Sent: 8/6/2023 10:24 PM EDT  Subject: Iron infusion pre-authorization     Hello Doctor,      Can you please authorize my iron infusions and send the order to [facility]?  So, that I am able to schedule my iron infusions.     -Thank you

## 2023-08-17 ENCOUNTER — HOSPITAL ENCOUNTER (OUTPATIENT)
Dept: INFUSION CENTER | Facility: HOSPITAL | Age: 43
End: 2023-08-17

## 2023-09-04 DIAGNOSIS — E03.9 HYPOTHYROIDISM, UNSPECIFIED TYPE: ICD-10-CM

## 2023-09-05 RX ORDER — LEVOTHYROXINE SODIUM 0.07 MG/1
75 TABLET ORAL DAILY
Qty: 90 TABLET | Refills: 0 | Status: SHIPPED | OUTPATIENT
Start: 2023-09-05

## 2023-09-11 ENCOUNTER — TELEPHONE (OUTPATIENT)
Dept: INTERNAL MEDICINE CLINIC | Facility: CLINIC | Age: 43
End: 2023-09-11

## 2023-09-11 NOTE — TELEPHONE ENCOUNTER
Pt called she is approved or the infusion and she needs a new order in order for her to get this set up please advise thank you

## 2023-09-13 DIAGNOSIS — D50.0 IRON DEFICIENCY ANEMIA DUE TO CHRONIC BLOOD LOSS: Primary | ICD-10-CM

## 2023-09-13 RX ORDER — SODIUM CHLORIDE 9 MG/ML
20 INJECTION, SOLUTION INTRAVENOUS ONCE
Status: CANCELLED | OUTPATIENT
Start: 2023-09-18

## 2023-09-18 ENCOUNTER — HOSPITAL ENCOUNTER (OUTPATIENT)
Dept: INFUSION CENTER | Facility: HOSPITAL | Age: 43
Discharge: HOME | End: 2023-09-18
Payer: COMMERCIAL

## 2023-09-18 VITALS
TEMPERATURE: 97.1 F | OXYGEN SATURATION: 100 % | RESPIRATION RATE: 14 BRPM | DIASTOLIC BLOOD PRESSURE: 58 MMHG | SYSTOLIC BLOOD PRESSURE: 91 MMHG | HEART RATE: 68 BPM

## 2023-09-18 DIAGNOSIS — D50.0 IRON DEFICIENCY ANEMIA DUE TO CHRONIC BLOOD LOSS: Primary | ICD-10-CM

## 2023-09-18 PROCEDURE — 96365 THER/PROPH/DIAG IV INF INIT: CPT

## 2023-09-18 RX ORDER — SODIUM CHLORIDE 9 MG/ML
20 INJECTION, SOLUTION INTRAVENOUS ONCE
Status: COMPLETED | OUTPATIENT
Start: 2023-09-18 | End: 2023-09-18

## 2023-09-18 RX ORDER — SODIUM CHLORIDE 9 MG/ML
20 INJECTION, SOLUTION INTRAVENOUS ONCE
Status: CANCELLED | OUTPATIENT
Start: 2023-09-25

## 2023-09-18 RX ADMIN — SODIUM CHLORIDE 20 ML/HR: 9 INJECTION, SOLUTION INTRAVENOUS at 10:24

## 2023-09-18 RX ADMIN — IRON SUCROSE 200 MG: 20 INJECTION, SOLUTION INTRAVENOUS at 10:24

## 2023-09-18 NOTE — PROGRESS NOTES
Patient iron infusion completed without complication. Patient declined AVS at this time and confirmed next appointment. Patient discharged in stable condition to home via ambulation.

## 2023-09-25 ENCOUNTER — HOSPITAL ENCOUNTER (OUTPATIENT)
Dept: INFUSION CENTER | Facility: HOSPITAL | Age: 43
Discharge: HOME | End: 2023-09-25
Payer: COMMERCIAL

## 2023-09-25 VITALS
SYSTOLIC BLOOD PRESSURE: 103 MMHG | OXYGEN SATURATION: 100 % | RESPIRATION RATE: 14 BRPM | HEART RATE: 61 BPM | DIASTOLIC BLOOD PRESSURE: 69 MMHG | TEMPERATURE: 97 F

## 2023-09-25 DIAGNOSIS — D50.0 IRON DEFICIENCY ANEMIA DUE TO CHRONIC BLOOD LOSS: Primary | ICD-10-CM

## 2023-09-25 PROCEDURE — 96365 THER/PROPH/DIAG IV INF INIT: CPT

## 2023-09-25 RX ORDER — SODIUM CHLORIDE 9 MG/ML
20 INJECTION, SOLUTION INTRAVENOUS ONCE
Status: COMPLETED | OUTPATIENT
Start: 2023-09-25 | End: 2023-09-25

## 2023-09-25 RX ORDER — SODIUM CHLORIDE 9 MG/ML
20 INJECTION, SOLUTION INTRAVENOUS ONCE
Status: CANCELLED | OUTPATIENT
Start: 2023-10-02

## 2023-09-25 RX ADMIN — SODIUM CHLORIDE 20 ML/HR: 9 INJECTION, SOLUTION INTRAVENOUS at 10:31

## 2023-09-25 RX ADMIN — IRON SUCROSE 200 MG: 20 INJECTION, SOLUTION INTRAVENOUS at 10:28

## 2023-09-25 NOTE — PROGRESS NOTES
Pt tolerated venofer infusion without difficulty. PIV removed. Pt aware of next appt. Left ambulatory for d/c. [Negative] : Psychiatric

## 2023-10-02 ENCOUNTER — HOSPITAL ENCOUNTER (OUTPATIENT)
Dept: INFUSION CENTER | Facility: HOSPITAL | Age: 43
Discharge: HOME | End: 2023-10-02
Payer: COMMERCIAL

## 2023-10-02 VITALS
DIASTOLIC BLOOD PRESSURE: 69 MMHG | OXYGEN SATURATION: 100 % | SYSTOLIC BLOOD PRESSURE: 98 MMHG | TEMPERATURE: 96.2 F | RESPIRATION RATE: 14 BRPM | HEART RATE: 62 BPM

## 2023-10-02 DIAGNOSIS — D50.0 IRON DEFICIENCY ANEMIA DUE TO CHRONIC BLOOD LOSS: Primary | ICD-10-CM

## 2023-10-02 PROCEDURE — 96365 THER/PROPH/DIAG IV INF INIT: CPT

## 2023-10-02 RX ORDER — SODIUM CHLORIDE 9 MG/ML
20 INJECTION, SOLUTION INTRAVENOUS ONCE
Status: COMPLETED | OUTPATIENT
Start: 2023-10-02 | End: 2023-10-02

## 2023-10-02 RX ORDER — SODIUM CHLORIDE 9 MG/ML
20 INJECTION, SOLUTION INTRAVENOUS ONCE
Status: CANCELLED | OUTPATIENT
Start: 2023-10-02

## 2023-10-02 RX ADMIN — IRON SUCROSE 200 MG: 20 INJECTION, SOLUTION INTRAVENOUS at 10:33

## 2023-10-02 RX ADMIN — SODIUM CHLORIDE 20 ML/HR: 9 INJECTION, SOLUTION INTRAVENOUS at 10:33

## 2023-10-02 NOTE — PROGRESS NOTES
Pt here for Venofer; pt asked when to have repeat lab; pt to have repeat labs in 2-4 weeks as per Dr. Mitchell Agent; pt made aware; pt tolerated treatment with no adv reactions; therapy plan completed; pt left unit amb with steady gait.

## 2023-11-08 ENCOUNTER — APPOINTMENT (OUTPATIENT)
Dept: LAB | Facility: CLINIC | Age: 43
End: 2023-11-08
Payer: COMMERCIAL

## 2023-11-08 DIAGNOSIS — D50.0 IRON DEFICIENCY ANEMIA DUE TO CHRONIC BLOOD LOSS: ICD-10-CM

## 2023-11-08 LAB
BASOPHILS # BLD AUTO: 0.02 THOUSANDS/ÂΜL (ref 0–0.1)
BASOPHILS NFR BLD AUTO: 1 % (ref 0–1)
EOSINOPHIL # BLD AUTO: 0.06 THOUSAND/ÂΜL (ref 0–0.61)
EOSINOPHIL NFR BLD AUTO: 2 % (ref 0–6)
ERYTHROCYTE [DISTWIDTH] IN BLOOD BY AUTOMATED COUNT: 15.6 % (ref 11.6–15.1)
FERRITIN SERPL-MCNC: 32 NG/ML (ref 11–307)
HCT VFR BLD AUTO: 36.8 % (ref 34.8–46.1)
HGB BLD-MCNC: 11.9 G/DL (ref 11.5–15.4)
IMM GRANULOCYTES # BLD AUTO: 0.01 THOUSAND/UL (ref 0–0.2)
IMM GRANULOCYTES NFR BLD AUTO: 0 % (ref 0–2)
IRON SATN MFR SERPL: 54 % (ref 15–50)
IRON SERPL-MCNC: 174 UG/DL (ref 50–212)
LYMPHOCYTES # BLD AUTO: 0.87 THOUSANDS/ÂΜL (ref 0.6–4.47)
LYMPHOCYTES NFR BLD AUTO: 27 % (ref 14–44)
MCH RBC QN AUTO: 28.5 PG (ref 26.8–34.3)
MCHC RBC AUTO-ENTMCNC: 32.3 G/DL (ref 31.4–37.4)
MCV RBC AUTO: 88 FL (ref 82–98)
MONOCYTES # BLD AUTO: 0.22 THOUSAND/ÂΜL (ref 0.17–1.22)
MONOCYTES NFR BLD AUTO: 7 % (ref 4–12)
NEUTROPHILS # BLD AUTO: 2.06 THOUSANDS/ÂΜL (ref 1.85–7.62)
NEUTS SEG NFR BLD AUTO: 63 % (ref 43–75)
NRBC BLD AUTO-RTO: 0 /100 WBCS
PLATELET # BLD AUTO: 279 THOUSANDS/UL (ref 149–390)
PMV BLD AUTO: 10.9 FL (ref 8.9–12.7)
RBC # BLD AUTO: 4.18 MILLION/UL (ref 3.81–5.12)
TIBC SERPL-MCNC: 320 UG/DL (ref 250–450)
UIBC SERPL-MCNC: 146 UG/DL (ref 155–355)
WBC # BLD AUTO: 3.24 THOUSAND/UL (ref 4.31–10.16)

## 2023-11-08 PROCEDURE — 82728 ASSAY OF FERRITIN: CPT

## 2023-11-08 PROCEDURE — 36415 COLL VENOUS BLD VENIPUNCTURE: CPT

## 2023-11-08 PROCEDURE — 85025 COMPLETE CBC W/AUTO DIFF WBC: CPT

## 2023-11-08 PROCEDURE — 83550 IRON BINDING TEST: CPT

## 2023-11-08 PROCEDURE — 83540 ASSAY OF IRON: CPT

## 2023-11-15 DIAGNOSIS — D50.0 IRON DEFICIENCY ANEMIA DUE TO CHRONIC BLOOD LOSS: Primary | ICD-10-CM

## 2023-11-27 DIAGNOSIS — E03.9 HYPOTHYROIDISM, UNSPECIFIED TYPE: ICD-10-CM

## 2023-11-27 RX ORDER — LEVOTHYROXINE SODIUM 0.07 MG/1
75 TABLET ORAL DAILY
Qty: 90 TABLET | Refills: 0 | Status: SHIPPED | OUTPATIENT
Start: 2023-11-27

## 2024-02-09 ENCOUNTER — OFFICE VISIT (OUTPATIENT)
Age: 44
End: 2024-02-09
Payer: COMMERCIAL

## 2024-02-09 VITALS — WEIGHT: 120.6 LBS | BODY MASS INDEX: 22.06 KG/M2 | DIASTOLIC BLOOD PRESSURE: 62 MMHG | SYSTOLIC BLOOD PRESSURE: 112 MMHG

## 2024-02-09 DIAGNOSIS — K40.90 NON-RECURRENT UNILATERAL INGUINAL HERNIA WITHOUT OBSTRUCTION OR GANGRENE: ICD-10-CM

## 2024-02-09 DIAGNOSIS — N92.0 MENORRHAGIA WITH REGULAR CYCLE: Primary | ICD-10-CM

## 2024-02-09 PROCEDURE — 99213 OFFICE O/P EST LOW 20 MIN: CPT | Performed by: OBSTETRICS & GYNECOLOGY

## 2024-02-12 PROBLEM — E53.8 B12 DEFICIENCY: Status: ACTIVE | Noted: 2018-02-06

## 2024-02-12 PROBLEM — Z92.89 HISTORY OF POSITIVE PPD: Status: ACTIVE | Noted: 2018-11-07

## 2024-02-12 NOTE — PROGRESS NOTES
Gynecology   Aicha Kaminski 43 y.o. female MRN: [id number]    Assessment/Plan     1. Menorrhagia with regular cycle    - discussed potential for existing endometriosis and adenomyosis  - reviewed options for treatment including Mirena IUD, Novasure ablation, hormonal contraception and hysterectomy  - patient opts for expectant management at this time  - US pelvis complete w transvaginal; Future      2. Non-recurrent unilateral inguinal hernia without obstruction or gangrene    - Ambulatory Referral to General Surgery; Future  -discussed potential for assessing pelvic intra-op if surgery pursued      HPI:  Aicha Kaminski is a 43 y.o. female who presents with episodic RLQ over the past few months.  Has a known h/o a uterine fibroid as well as excision of endometrioma of  scar incision.  Recent CT scan demonstrated only the fibroid.  She travelled to [facility] where a sono showed probable endometrial polyp and a direct right inguinal hernia.  She is looking for guidance as to what she should do next.  She reports very heavy menses to the point she soaks through clothing.  She has required IV iron infusions as well.  She feels that her menstrual symptoms are overall manageable.  Her mother had her menopause in her mid-40's.          Historical Information   Past Medical History:   Diagnosis Date    Endometriosis 2017    Hypothyroid      Past Surgical History:   Procedure Laterality Date     SECTION       OB/GYN History:   OB History          1    Para   1    Term   1            AB        Living   1         SAB        IAB        Ectopic        Multiple        Live Births   1                 Family History   Problem Relation Age of Onset    Hypothyroidism Mother     Hypertension Mother     Diabetes Father     Heart disease Father      Social History   Social History     Substance and Sexual Activity   Alcohol Use Yes    Alcohol/week: 1.0 standard drink of alcohol    Types: 1 Glasses of wine  per week    Comment: socially     Social History     Substance and Sexual Activity   Drug Use Never     Social History     Tobacco Use   Smoking Status Never   Smokeless Tobacco Never     E-Cigarette/Vaping    E-Cigarette Use Never User      E-Cigarette/Vaping Substances    Nicotine No     THC No     CBD No     Flavoring No     Other No     Unknown No        Meds/Allergies   Current Outpatient Medications on File Prior to Visit   Medication Sig    ferrous gluconate (FERGON) 324 mg tablet Take 324 mg by mouth if needed    levothyroxine 75 mcg tablet Take 1 tablet (75 mcg total) by mouth daily     No current facility-administered medications on file prior to visit.       No Known Allergies    Review of Systems   Constitutional: Negative for chills, fever, malaise/fatigue and night sweats.   Gastrointestinal:  Negative for bloating, abdominal pain, change in bowel habit and nausea.   Genitourinary:  Positive for menorrhagia and pelvic pain. Negative for missed menses and non-menstrual bleeding.   Neurological:  Negative for dizziness, headaches, light-headedness and weakness.         Objective   Vitals: Blood pressure 112/62, weight 54.7 kg (120 lb 9.6 oz), last menstrual period 01/25/2024.    Physical Exam  Constitutional:       Appearance: Normal appearance.   HENT:      Head: Normocephalic.   Cardiovascular:      Rate and Rhythm: Normal rate and regular rhythm.   Pulmonary:      Effort: Pulmonary effort is normal.   Musculoskeletal:         General: No swelling.   Neurological:      General: No focal deficit present.      Mental Status: She is alert and oriented to person, place, and time.   Skin:     General: Skin is warm and dry.   Psychiatric:         Mood and Affect: Mood normal.         Behavior: Behavior normal.   Vitals reviewed.

## 2024-02-21 ENCOUNTER — OFFICE VISIT (OUTPATIENT)
Dept: INTERNAL MEDICINE CLINIC | Facility: CLINIC | Age: 44
End: 2024-02-21
Payer: COMMERCIAL

## 2024-02-21 VITALS
SYSTOLIC BLOOD PRESSURE: 113 MMHG | HEART RATE: 61 BPM | TEMPERATURE: 98.2 F | WEIGHT: 122 LBS | BODY MASS INDEX: 22.45 KG/M2 | DIASTOLIC BLOOD PRESSURE: 64 MMHG | HEIGHT: 62 IN | OXYGEN SATURATION: 98 %

## 2024-02-21 DIAGNOSIS — Z12.31 ENCOUNTER FOR SCREENING MAMMOGRAM FOR BREAST CANCER: ICD-10-CM

## 2024-02-21 DIAGNOSIS — E53.8 B12 DEFICIENCY: ICD-10-CM

## 2024-02-21 DIAGNOSIS — N92.0 MENORRHAGIA WITH REGULAR CYCLE: ICD-10-CM

## 2024-02-21 DIAGNOSIS — R22.1 LOCALIZED SWELLING, MASS AND LUMP, NECK: ICD-10-CM

## 2024-02-21 DIAGNOSIS — J30.89 SEASONAL ALLERGIC RHINITIS DUE TO OTHER ALLERGIC TRIGGER: ICD-10-CM

## 2024-02-21 DIAGNOSIS — Z00.00 ANNUAL PHYSICAL EXAM: Primary | ICD-10-CM

## 2024-02-21 DIAGNOSIS — E55.9 VITAMIN D DEFICIENCY: ICD-10-CM

## 2024-02-21 DIAGNOSIS — D50.0 IRON DEFICIENCY ANEMIA DUE TO CHRONIC BLOOD LOSS: ICD-10-CM

## 2024-02-21 DIAGNOSIS — E03.9 HYPOTHYROIDISM (ACQUIRED): ICD-10-CM

## 2024-02-21 PROBLEM — J30.9 ALLERGIC RHINITIS DUE TO ALLERGEN: Status: ACTIVE | Noted: 2024-02-21

## 2024-02-21 PROCEDURE — 99214 OFFICE O/P EST MOD 30 MIN: CPT | Performed by: INTERNAL MEDICINE

## 2024-02-21 PROCEDURE — 99396 PREV VISIT EST AGE 40-64: CPT | Performed by: INTERNAL MEDICINE

## 2024-02-21 RX ORDER — MELATONIN
1000 DAILY
Qty: 90 TABLET | Refills: 1 | Status: SHIPPED | OUTPATIENT
Start: 2024-02-21

## 2024-02-21 NOTE — PROGRESS NOTES
ADULT ANNUAL PHYSICAL  [facility] INTERNAL MEDICINE [place]    NAME: Aicha Kaminski  AGE: 43 y.o. SEX: female  : 1980     DATE: 2024     Assessment and Plan:     Here today for annual physical  Medical history of menorrhagia, uterine fibroid, status post excision of endometrioma of  scar incision, ovarian cyst, B12 deficiency, iron deficiency, hypothyroidism     Order for mammogram placed today  Patient following with gynecology, will discuss getting updated Pap smear at next visit with them     Problem List Items Addressed This Visit     Menorrhagia with regular cycle     Following with gynecology.  Known history of uterine fibroids.  Had CT of the abdomen and pelvis 2023 showing possible uterine fibroid/leiomyoma.  Small foci left adnexal region representing ovarian follicle/cysts.  She was evaluated in [place] where ultrasound showed probable endometrial polyp with direct right inguinal hernia.  She continues to have heavy menses  -She has been referred to general surgery regarding repair of her hernia  -Continue follow-up with gynecology regarding menorrhagia and treatment options for adenomyosis/endometriosis         B12 deficiency    Relevant Orders    Vitamin B12    Iron deficiency anemia     History of iron deficiency anemia likely related to menorrhagia.  Previously received IV iron infusions.  We will recheck CBC and iron panel at this time         Relevant Orders    CBC and differential    Iron Panel (Includes Ferritin, Iron Sat%, Iron, and TIBC)    Hypothyroidism (acquired)     Continue levothyroxine.  Recheck TSH         Relevant Orders    Comprehensive metabolic panel    Lipid Panel with Direct LDL reflex    TSH, 3rd generation with Free T4 reflex    Localized swelling, mass and lump, neck     Intermittent right anterior neck swelling for the past 4 months or so.  She was evaluated by ENT physician in [place], given prescription for  antibiotic.  On exam today, it does appear she has prominent right submandibular lymph node.  Nontender.  She has had ongoing issues with allergic rhinitis symptoms  -Given ongoing recurrence of neck swelling, recommend further evaluation with ultrasound to evaluate for pathologic lymphadenopathy         Relevant Orders    US head neck soft tissue    Allergic rhinitis due to allergen     Chronic rhinorrhea, sneezing, sinus congestion, itchy eyes  -Recommend starting Flonase, oral antihistamine.  Can also consider nasal saline rinse prior to using the Flonase  -Will recommend to allergist for allergy testing         Relevant Orders    Ambulatory Referral to Allergy   Other Visit Diagnoses     Annual physical exam    -  Primary    Vitamin D deficiency        Relevant Medications    cholecalciferol (VITAMIN D3) 1,000 units tablet    Other Relevant Orders    Vitamin D 25 hydroxy    Encounter for screening mammogram for breast cancer        Relevant Orders    Mammo screening bilateral w 3d & cad            Immunizations and preventive care screenings were discussed with patient today. Appropriate education was printed on patient's after visit summary.           Return in about 1 year (around 2025).     Chief Complaint:     Chief Complaint   Patient presents with   • Physical Exam     Yearly Physical       History of Present Illness:      Depression Screening  PHQ-2/9 Depression Screening    Little interest or pleasure in doing things: 0 - not at all  Feeling down, depressed, or hopeless: 0 - not at all  PHQ-2 Score: 0  PHQ-2 Interpretation: Negative depression screen          Review of Systems:     Review of Systems   Past Medical History:     Past Medical History:   Diagnosis Date   • Endometriosis    • Hypothyroid       Past Surgical History:     Past Surgical History:   Procedure Laterality Date   •  SECTION        Social History:     Social History     Socioeconomic History   • Marital  "status: /Civil Union     Spouse name: None   • Number of children: None   • Years of education: None   • Highest education level: None   Occupational History   • None   Tobacco Use   • Smoking status: Never   • Smokeless tobacco: Never   Vaping Use   • Vaping status: Never Used   Substance and Sexual Activity   • Alcohol use: Yes     Alcohol/week: 1.0 standard drink of alcohol     Types: 1 Glasses of wine per week     Comment: socially   • Drug use: Never   • Sexual activity: Yes     Partners: Male     Birth control/protection: Condom Male   Other Topics Concern   • None   Social History Narrative   • None     Social Determinants of Health     Financial Resource Strain: Not on file   Food Insecurity: Not on file   Transportation Needs: Not on file   Physical Activity: Not on file   Stress: Not on file   Social Connections: Not on file   Intimate Partner Violence: Not on file   Housing Stability: Not on file      Family History:     Family History   Problem Relation Age of Onset   • Hypothyroidism Mother    • Hypertension Mother    • Diabetes Father    • Heart disease Father       Current Medications:     Current Outpatient Medications   Medication Sig Dispense Refill   • cholecalciferol (VITAMIN D3) 1,000 units tablet Take 1 tablet (1,000 Units total) by mouth daily 90 tablet 1   • ferrous gluconate (FERGON) 324 mg tablet Take 324 mg by mouth if needed     • levothyroxine 75 mcg tablet Take 1 tablet (75 mcg total) by mouth daily 90 tablet 0     No current facility-administered medications for this visit.      Allergies:     No Known Allergies   Physical Exam:     /64 (BP Location: Left arm, Patient Position: Sitting, Cuff Size: Standard)   Pulse 61   Temp 98.2 °F (36.8 °C)   Ht 5' 2\" (1.575 m)   Wt 55.3 kg (122 lb)   LMP 01/25/2024 (Exact Date)   SpO2 98%   BMI 22.31 kg/m²     Physical Exam  Constitutional:       Appearance: Normal appearance. She is not ill-appearing.   HENT:      Head: " Normocephalic and atraumatic.   Eyes:      General: No scleral icterus.        Right eye: No discharge.         Left eye: No discharge.   Cardiovascular:      Rate and Rhythm: Normal rate and regular rhythm.      Heart sounds: No murmur heard.     No friction rub.   Pulmonary:      Effort: Pulmonary effort is normal.      Breath sounds: Normal breath sounds. No wheezing or rales.   Abdominal:      General: Abdomen is flat. There is no distension.      Palpations: Abdomen is soft.      Tenderness: There is no abdominal tenderness.   Musculoskeletal:         General: No swelling, tenderness or deformity.   Lymphadenopathy:      Cervical: Cervical adenopathy present.   Skin:     General: Skin is warm and dry.      Findings: No erythema.   Neurological:      Mental Status: She is alert and oriented to person, place, and time. Mental status is at baseline.      Motor: No weakness.   Psychiatric:         Mood and Affect: Mood normal.         Behavior: Behavior normal.          Marco Perez,   [facility] INTERNAL MEDICINE [place]

## 2024-02-21 NOTE — PATIENT INSTRUCTIONS
Recommend beginning daily intranasal steroid spray such as Flonase or Nasacort.  You can combine this with an oral antihistamine such as Claritin, Zyrtec, Allegra, or Xyzal.  As an alternative, you can also combine the Flonase nasal spray with intranasal antihistamine, Astepro    Wellness Visit for Adults   AMBULATORY CARE:   A wellness visit  is when you see your healthcare provider to get screened for health problems. Your healthcare provider will also give you advice on how to stay healthy. Write down your questions so you remember to ask them. Ask your healthcare provider how often you should have a wellness visit.  What happens at a wellness visit:  Your healthcare provider will ask about your health, and your family history of health problems. This includes high blood pressure, heart disease, and cancer. He or she will ask if you have symptoms that concern you, if you smoke, and about your mood. You may also be asked about your intake of medicines, supplements, food, and alcohol. Any of the following may be done:  Your weight  will be checked. Your height may also be checked so your body mass index (BMI) can be calculated. Your BMI shows if you are at a healthy weight.    Your blood pressure  and heart rate will be checked. Your temperature may also be checked.    Blood and urine tests  may be done. Blood tests may be done to check your cholesterol levels. Abnormal cholesterol levels increase your risk for heart disease and stroke. You may also need a blood or urine test to check for diabetes if you are at increased risk. Urine tests may be done to look for signs of an infection or kidney disease.    A physical exam  includes checking your heartbeat and lungs with a stethoscope. Your healthcare provider may also check your skin to look for sun damage.    Screening tests  may be recommended. A screening test is done to check for diseases that may not cause symptoms. The screening tests you may need depend on  your age, gender, family history, and lifestyle habits. For example, colorectal screening may be recommended if you are 50 years old or older.    Screening tests you need if you are a woman:   A Pap smear  is used to screen for cervical cancer. Pap smears are usually done every 3 to 5 years depending on your age. You may need them more often if you have had abnormal Pap smear test results in the past. Ask your healthcare provider how often you should have a Pap smear.    A mammogram  is an x-ray of your breasts to screen for breast cancer. Experts recommend mammograms every 2 years starting at age 50 years. You may need a mammogram at age 49 years or younger if you have an increased risk for breast cancer. Talk to your healthcare provider about when you should start having mammograms and how often you need them.    Vaccines you may need:   Get an influenza vaccine  every year. The influenza vaccine protects you from the flu. Several types of viruses cause the flu. The viruses change over time, so new vaccines are made each year.    Get a tetanus-diphtheria (Td) booster vaccine  every 10 years. This vaccine protects you against tetanus and diphtheria. Tetanus is a severe infection that may cause painful muscle spasms and lockjaw. Diphtheria is a severe bacterial infection that causes a thick covering in the back of your mouth and throat.    Get a human papillomavirus (HPV) vaccine  if you are female and aged 19 to 26 or male 19 to 21 and never received it. This vaccine protects you from HPV infection. HPV is the most common infection spread by sexual contact. HPV may also cause vaginal, penile, and anal cancers.    Get a pneumococcal vaccine  if you are aged 65 years or older. The pneumococcal vaccine is an injection given to protect you from pneumococcal disease. Pneumococcal disease is an infection caused by pneumococcal bacteria. The infection may cause pneumonia, meningitis, or an ear infection.    Get a shingles  vaccine  if you are 60 or older, even if you have had shingles before. The shingles vaccine is an injection to protect you from the varicella-zoster virus. This is the same virus that causes chickenpox. Shingles is a painful rash that develops in people who had chickenpox or have been exposed to the virus.    How to eat healthy:  My Plate is a model for planning healthy meals. It shows the types and amounts of foods that should go on your plate. Fruits and vegetables make up about half of your plate, and grains and protein make up the other half. A serving of dairy is included on the side of your plate. The amount of calories and serving sizes you need depends on your age, gender, weight, and height. Examples of healthy foods are listed below:  Eat a variety of vegetables  such as dark green, red, and orange vegetables. You can also include canned vegetables low in sodium (salt) and frozen vegetables without added butter or sauces.    Eat a variety of fresh fruits , canned fruit in 100% juice, frozen fruit, and dried fruit.    Include whole grains.  At least half of the grains you eat should be whole grains. Examples include whole-wheat bread, wheat pasta, brown rice, and whole-grain cereals such as oatmeal.    Eat a variety of protein foods such as seafood (fish and shellfish), lean meat, and poultry without skin (turkey and chicken). Examples of lean meats include pork leg, shoulder, or tenderloin, and beef round, sirloin, tenderloin, and extra lean ground beef. Other protein foods include eggs and egg substitutes, beans, peas, soy products, nuts, and seeds.    Choose low-fat dairy products such as skim or 1% milk or low-fat yogurt, cheese, and cottage cheese.    Limit unhealthy fats  such as butter, hard margarine, and shortening.       Exercise:  Exercise at least 30 minutes per day on most days of the week. Some examples of exercise include walking, biking, dancing, and swimming. You can also fit in more  physical activity by taking the stairs instead of the elevator or parking farther away from stores. Include muscle strengthening activities 2 days each week. Regular exercise provides many health benefits. It helps you manage your weight, and decreases your risk for type 2 diabetes, heart disease, stroke, and high blood pressure. Exercise can also help improve your mood. Ask your healthcare provider about the best exercise plan for you.       General health and safety guidelines:   Do not smoke.  Nicotine and other chemicals in cigarettes and cigars can cause lung damage. Ask your healthcare provider for information if you currently smoke and need help to quit. E-cigarettes or smokeless tobacco still contain nicotine. Talk to your healthcare provider before you use these products.    Limit alcohol.  A drink of alcohol is 12 ounces of beer, 5 ounces of wine, or 1½ ounces of liquor.    Lose weight, if needed.  Being overweight increases your risk of certain health conditions. These include heart disease, high blood pressure, type 2 diabetes, and certain types of cancer.    Protect your skin.  Do not sunbathe or use tanning beds. Use sunscreen with a SPF 15 or higher. Apply sunscreen at least 15 minutes before you go outside. Reapply sunscreen every 2 hours. Wear protective clothing, hats, and sunglasses when you are outside.    Drive safely.  Always wear your seatbelt. Make sure everyone in your car wears a seatbelt. A seatbelt can save your life if you are in an accident. Do not use your cell phone when you are driving. This could distract you and cause an accident. Pull over if you need to make a call or send a text message.    Practice safe sex.  Use latex condoms if are sexually active and have more than one partner. Your healthcare provider may recommend screening tests for sexually transmitted infections (STIs).    Wear helmets, lifejackets, and protective gear.  Always wear a helmet when you ride a bike or  motorcycle, go skiing, or play sports that could cause a head injury. Wear protective equipment when you play sports. Wear a lifejacket when you are on a boat or doing water sports.    © Copyright [employer] 2023 Information is for End User's use only and may not be sold, redistributed or otherwise used for commercial purposes.  The above information is an  only. It is not intended as medical advice for individual conditions or treatments. Talk to your doctor, nurse or pharmacist before following any medical regimen to see if it is safe and effective for you.

## 2024-02-22 NOTE — ASSESSMENT & PLAN NOTE
Chronic rhinorrhea, sneezing, sinus congestion, itchy eyes  -Recommend starting Flonase, oral antihistamine.  Can also consider nasal saline rinse prior to using the Flonase  -Will recommend to allergist for allergy testing

## 2024-02-22 NOTE — ASSESSMENT & PLAN NOTE
Intermittent right anterior neck swelling for the past 4 months or so.  She was evaluated by ENT physician in [place], given prescription for antibiotic.  On exam today, it does appear she has prominent right submandibular lymph node.  Nontender.  She has had ongoing issues with allergic rhinitis symptoms  -Given ongoing recurrence of neck swelling, recommend further evaluation with ultrasound to evaluate for pathologic lymphadenopathy

## 2024-02-22 NOTE — ASSESSMENT & PLAN NOTE
History of iron deficiency anemia likely related to menorrhagia.  Previously received IV iron infusions.  We will recheck CBC and iron panel at this time

## 2024-02-22 NOTE — ASSESSMENT & PLAN NOTE
Following with gynecology.  Known history of uterine fibroids.  Had CT of the abdomen and pelvis January 2023 showing possible uterine fibroid/leiomyoma.  Small foci left adnexal region representing ovarian follicle/cysts.  She was evaluated in [place] where ultrasound showed probable endometrial polyp with direct right inguinal hernia.  She continues to have heavy menses  -She has been referred to general surgery regarding repair of her hernia  -Continue follow-up with gynecology regarding menorrhagia and treatment options for adenomyosis/endometriosis

## 2024-02-25 DIAGNOSIS — E03.9 HYPOTHYROIDISM, UNSPECIFIED TYPE: ICD-10-CM

## 2024-02-25 RX ORDER — LEVOTHYROXINE SODIUM 0.07 MG/1
75 TABLET ORAL DAILY
Qty: 90 TABLET | Refills: 0 | Status: SHIPPED | OUTPATIENT
Start: 2024-02-25

## 2024-02-28 ENCOUNTER — HOSPITAL ENCOUNTER (OUTPATIENT)
Dept: RADIOLOGY | Facility: HOSPITAL | Age: 44
Discharge: HOME | End: 2024-02-28
Attending: INTERNAL MEDICINE
Payer: COMMERCIAL

## 2024-02-28 DIAGNOSIS — R22.1 LOCALIZED SWELLING, MASS AND LUMP, NECK: ICD-10-CM

## 2024-02-28 PROCEDURE — 76536 US EXAM OF HEAD AND NECK: CPT

## 2024-02-29 DIAGNOSIS — R59.0 LYMPHADENOPATHY, CERVICAL: Primary | ICD-10-CM

## 2024-03-01 ENCOUNTER — TELEPHONE (OUTPATIENT)
Age: 44
End: 2024-03-01

## 2024-03-01 ENCOUNTER — TELEPHONE (OUTPATIENT)
Dept: INTERNAL MEDICINE CLINIC | Facility: CLINIC | Age: 44
End: 2024-03-01

## 2024-03-01 NOTE — TELEPHONE ENCOUNTER
----- Message from Marco Perez, DO sent at 2/29/2024  8:28 PM EST -----  Please give patient a call and make sure she has seen mychart message regarding neck ultrasound results, thanks

## 2024-03-01 NOTE — TELEPHONE ENCOUNTER
Rcvd call from patient, she saw the results of neck US on [facility]. Read the note about her needing an  US guided lymph node biopsy. Instructed her to call Central scheduling to schedule that appointment. The order is already in the system.

## 2024-03-04 NOTE — NURSING NOTE
Call placed to patient to discuss upcoming appointment at [facility] radiology department and complete consultation with patient. Patient is having a right lymph node biopsy utilizing  US guidance. Reviewed patient's allergies, no current anticoagulant medication present per patient, also discussed the pre and post procedure expectations. Patient made aware of need for  post procedure if anti anxiety medication is taken. Reminded patient of location and time expected for procedure, Patient expressed understanding by verbalizing and repeating instructions.

## 2024-03-13 ENCOUNTER — HOSPITAL ENCOUNTER (OUTPATIENT)
Dept: RADIOLOGY | Facility: HOSPITAL | Age: 44
Discharge: HOME | End: 2024-03-13
Attending: INTERNAL MEDICINE
Payer: COMMERCIAL

## 2024-03-13 DIAGNOSIS — R59.0 LYMPHADENOPATHY, CERVICAL: ICD-10-CM

## 2024-03-13 PROCEDURE — 10005 FNA BX W/US GDN 1ST LES: CPT

## 2024-03-13 PROCEDURE — 88172 CYTP DX EVAL FNA 1ST EA SITE: CPT | Performed by: STUDENT IN AN ORGANIZED HEALTH CARE EDUCATION/TRAINING PROGRAM

## 2024-03-13 PROCEDURE — 88173 CYTOPATH EVAL FNA REPORT: CPT | Performed by: STUDENT IN AN ORGANIZED HEALTH CARE EDUCATION/TRAINING PROGRAM

## 2024-03-13 RX ORDER — LIDOCAINE HYDROCHLORIDE 10 MG/ML
2 INJECTION, SOLUTION EPIDURAL; INFILTRATION; INTRACAUDAL; PERINEURAL ONCE
Status: COMPLETED | OUTPATIENT
Start: 2024-03-13 | End: 2024-03-13

## 2024-03-13 RX ADMIN — LIDOCAINE HYDROCHLORIDE 2 ML: 10 INJECTION, SOLUTION EPIDURAL; INFILTRATION; INTRACAUDAL; PERINEURAL at 11:03

## 2024-03-14 ENCOUNTER — TELEPHONE (OUTPATIENT)
Dept: RADIOLOGY | Facility: HOSPITAL | Age: 44
End: 2024-03-14

## 2024-03-14 NOTE — NURSING NOTE
Received call from patient who on 3/13/24 had a lymph node biopsy with KATIE Sorenson and MERARY Wagner. Per patient she took tylenol yesterday in the evening because of some discomfort at biopsy site and also followed the directions on applying the ice. Upon waking up today she felt the discomfort again and notice slight swelling at site, no other issues verbalized. She wanted to know if she can take more tylenol or Ibuprofen today and more ice. Reassured patient and explained she can continue with the discharge instructions of over the counter discomfort relief and ice to area for 20 minutes on and the rest of the hour off. MERARY Wagner made aware and no further instructions were given.

## 2024-03-15 DIAGNOSIS — R89.7 ABNORMAL BIOPSY RESULT: ICD-10-CM

## 2024-03-15 DIAGNOSIS — R59.0 LYMPHADENOPATHY, CERVICAL: Primary | ICD-10-CM

## 2024-04-03 ENCOUNTER — APPOINTMENT (OUTPATIENT)
Dept: LAB | Facility: CLINIC | Age: 44
End: 2024-04-03
Payer: COMMERCIAL

## 2024-04-03 DIAGNOSIS — R89.7 ABNORMAL BIOPSY RESULT: ICD-10-CM

## 2024-04-03 DIAGNOSIS — E53.8 B12 DEFICIENCY: ICD-10-CM

## 2024-04-03 DIAGNOSIS — E03.9 HYPOTHYROIDISM (ACQUIRED): ICD-10-CM

## 2024-04-03 DIAGNOSIS — R59.0 LYMPHADENOPATHY, CERVICAL: ICD-10-CM

## 2024-04-03 DIAGNOSIS — E55.9 VITAMIN D DEFICIENCY: ICD-10-CM

## 2024-04-03 DIAGNOSIS — D50.0 IRON DEFICIENCY ANEMIA DUE TO CHRONIC BLOOD LOSS: ICD-10-CM

## 2024-04-03 LAB
25(OH)D3 SERPL-MCNC: 17.2 NG/ML (ref 30–100)
ALBUMIN SERPL BCP-MCNC: 4 G/DL (ref 3.5–5)
ALP SERPL-CCNC: 45 U/L (ref 34–104)
ALT SERPL W P-5'-P-CCNC: 10 U/L (ref 7–52)
ANION GAP SERPL CALCULATED.3IONS-SCNC: 6 MMOL/L (ref 4–13)
AST SERPL W P-5'-P-CCNC: 15 U/L (ref 13–39)
BASOPHILS # BLD AUTO: 0.02 THOUSANDS/ÂΜL (ref 0–0.1)
BASOPHILS NFR BLD AUTO: 1 % (ref 0–1)
BILIRUB SERPL-MCNC: 0.32 MG/DL (ref 0.2–1)
BUN SERPL-MCNC: 16 MG/DL (ref 5–25)
CALCIUM SERPL-MCNC: 8.7 MG/DL (ref 8.4–10.2)
CHLORIDE SERPL-SCNC: 105 MMOL/L (ref 96–108)
CHOLEST SERPL-MCNC: 177 MG/DL
CO2 SERPL-SCNC: 27 MMOL/L (ref 21–32)
CREAT SERPL-MCNC: 0.52 MG/DL (ref 0.6–1.3)
EOSINOPHIL # BLD AUTO: 0.11 THOUSAND/ÂΜL (ref 0–0.61)
EOSINOPHIL NFR BLD AUTO: 3 % (ref 0–6)
ERYTHROCYTE [DISTWIDTH] IN BLOOD BY AUTOMATED COUNT: 13.2 % (ref 11.6–15.1)
FERRITIN SERPL-MCNC: 8 NG/ML (ref 11–307)
GFR SERPL CREATININE-BSD FRML MDRD: 119 ML/MIN/1.73SQ M
GLUCOSE P FAST SERPL-MCNC: 82 MG/DL (ref 65–99)
HCT VFR BLD AUTO: 35 % (ref 34.8–46.1)
HDLC SERPL-MCNC: 67 MG/DL
HGB BLD-MCNC: 11.4 G/DL (ref 11.5–15.4)
IMM GRANULOCYTES # BLD AUTO: 0.01 THOUSAND/UL (ref 0–0.2)
IMM GRANULOCYTES NFR BLD AUTO: 0 % (ref 0–2)
IRON SATN MFR SERPL: 5 % (ref 15–50)
IRON SERPL-MCNC: 20 UG/DL (ref 50–212)
LDLC SERPL CALC-MCNC: 98 MG/DL (ref 0–100)
LYMPHOCYTES # BLD AUTO: 0.96 THOUSANDS/ÂΜL (ref 0.6–4.47)
LYMPHOCYTES NFR BLD AUTO: 25 % (ref 14–44)
MCH RBC QN AUTO: 28.5 PG (ref 26.8–34.3)
MCHC RBC AUTO-ENTMCNC: 32.6 G/DL (ref 31.4–37.4)
MCV RBC AUTO: 88 FL (ref 82–98)
MONOCYTES # BLD AUTO: 0.28 THOUSAND/ÂΜL (ref 0.17–1.22)
MONOCYTES NFR BLD AUTO: 7 % (ref 4–12)
NEUTROPHILS # BLD AUTO: 2.47 THOUSANDS/ÂΜL (ref 1.85–7.62)
NEUTS SEG NFR BLD AUTO: 64 % (ref 43–75)
NRBC BLD AUTO-RTO: 0 /100 WBCS
PLATELET # BLD AUTO: 255 THOUSANDS/UL (ref 149–390)
PMV BLD AUTO: 10.5 FL (ref 8.9–12.7)
POTASSIUM SERPL-SCNC: 3.8 MMOL/L (ref 3.5–5.3)
PROT SERPL-MCNC: 6.6 G/DL (ref 6.4–8.4)
RBC # BLD AUTO: 4 MILLION/UL (ref 3.81–5.12)
SODIUM SERPL-SCNC: 138 MMOL/L (ref 135–147)
TIBC SERPL-MCNC: 395 UG/DL (ref 250–450)
TRIGL SERPL-MCNC: 60 MG/DL
TSH SERPL DL<=0.05 MIU/L-ACNC: 3.19 UIU/ML (ref 0.45–4.5)
UIBC SERPL-MCNC: 375 UG/DL (ref 155–355)
VIT B12 SERPL-MCNC: 322 PG/ML (ref 180–914)
WBC # BLD AUTO: 3.85 THOUSAND/UL (ref 4.31–10.16)

## 2024-04-03 PROCEDURE — 83540 ASSAY OF IRON: CPT

## 2024-04-03 PROCEDURE — 36415 COLL VENOUS BLD VENIPUNCTURE: CPT

## 2024-04-03 PROCEDURE — 83550 IRON BINDING TEST: CPT

## 2024-04-03 PROCEDURE — 80061 LIPID PANEL: CPT

## 2024-04-03 PROCEDURE — 80053 COMPREHEN METABOLIC PANEL: CPT

## 2024-04-03 PROCEDURE — 85025 COMPLETE CBC W/AUTO DIFF WBC: CPT

## 2024-04-03 PROCEDURE — 84443 ASSAY THYROID STIM HORMONE: CPT

## 2024-04-03 PROCEDURE — 82728 ASSAY OF FERRITIN: CPT

## 2024-04-03 PROCEDURE — 82306 VITAMIN D 25 HYDROXY: CPT

## 2024-04-03 PROCEDURE — 82607 VITAMIN B-12: CPT

## 2024-04-11 DIAGNOSIS — D50.0 IRON DEFICIENCY ANEMIA DUE TO CHRONIC BLOOD LOSS: ICD-10-CM

## 2024-04-11 DIAGNOSIS — D50.9 IRON DEFICIENCY ANEMIA, UNSPECIFIED IRON DEFICIENCY ANEMIA TYPE: Primary | ICD-10-CM

## 2024-04-11 RX ORDER — SODIUM CHLORIDE 9 MG/ML
20 INJECTION, SOLUTION INTRAVENOUS ONCE
OUTPATIENT
Start: 2024-04-18

## 2024-04-12 ENCOUNTER — HOSPITAL ENCOUNTER (OUTPATIENT)
Dept: RADIOLOGY | Facility: HOSPITAL | Age: 44
Discharge: HOME | End: 2024-04-12
Payer: COMMERCIAL

## 2024-04-12 DIAGNOSIS — R89.7 ABNORMAL BIOPSY RESULT: ICD-10-CM

## 2024-04-12 DIAGNOSIS — R59.0 LYMPHADENOPATHY, CERVICAL: ICD-10-CM

## 2024-04-12 PROCEDURE — 70491 CT SOFT TISSUE NECK W/DYE: CPT

## 2024-04-12 RX ADMIN — IOHEXOL 100 ML: 350 INJECTION, SOLUTION INTRAVENOUS at 17:11

## 2024-05-02 NOTE — NURSING NOTE
Called patient to go over the consultation for her upcoming lymph node biopsy. She informed me that the ordering provider wants a core biopsy of the area. Informed patient that I will reach out to the ordering provider to obtain a clarification since we do not do core biopsies here in radiology. [employer] text connect sent to provider requesting clarification. Confirmed with KATIE Delaney, they all the PA's and radiologist do not do core samples, it would be deferred to IR for the provider to speak to and see if they would perform.    responded and he does want a CT guided core biopsy. Sent request to  [facility] to see if IR will perform this procedure.   Spoke via teams to IR  on 5/3/24 @ 2205 and she submitted the request for core biopsy to the IR MD to review. Also called patient and informed her that I will appointment at the radiology will be cancelled. Patient wanted me to inform IR that she needs the result of the biopsy for her upcoming surgery at the end of the month, I informed  of this and to also reach out to the patient regardless of the MD decision of their review.

## 2024-05-03 DIAGNOSIS — R22.1 LOCALIZED SWELLING, MASS AND LUMP, NECK: Primary | ICD-10-CM

## 2024-05-15 ENCOUNTER — HOSPITAL ENCOUNTER (OUTPATIENT)
Dept: RADIOLOGY | Facility: HOSPITAL | Age: 44
Discharge: HOME | End: 2024-05-15
Attending: RADIOLOGY
Payer: COMMERCIAL

## 2024-05-15 ENCOUNTER — HOSPITAL ENCOUNTER (OUTPATIENT)
Dept: RADIOLOGY | Facility: HOSPITAL | Age: 44
Discharge: HOME | End: 2024-05-15
Attending: OTOLARYNGOLOGY
Payer: COMMERCIAL

## 2024-05-15 DIAGNOSIS — R22.1 LOCALIZED SWELLING, MASS AND LUMP, NECK: ICD-10-CM

## 2024-05-15 PROCEDURE — 88185 FLOWCYTOMETRY/TC ADD-ON: CPT | Performed by: OTOLARYNGOLOGY

## 2024-05-15 PROCEDURE — 76942 ECHO GUIDE FOR BIOPSY: CPT

## 2024-05-15 PROCEDURE — 38505 NEEDLE BIOPSY LYMPH NODES: CPT

## 2024-05-15 PROCEDURE — 88184 FLOWCYTOMETRY/ TC 1 MARKER: CPT | Performed by: OTOLARYNGOLOGY

## 2024-05-15 RX ORDER — LIDOCAINE WITH 8.4% SOD BICARB 0.9%(10ML)
SYRINGE (ML) INJECTION AS NEEDED
Status: COMPLETED | OUTPATIENT
Start: 2024-05-15 | End: 2024-05-15

## 2024-05-15 RX ADMIN — Medication 10 ML: at 12:28

## 2024-05-15 NOTE — DISCHARGE INSTRUCTIONS
POST BIOPSY    Care after your procedure:    1. Limit your activities for 24 hours after your biopsy.    2. No driving day of biopsy.    3. Return to your normal diet.Small sips of flat soda will help with mild nausea.    4. Remove band-aid or dressing 24 hours after procedure.     Contact Interventional Radiology at [phone] (FRANCES PATIENTS: Contact Interventional Radiology at [phone]) (HUMPHREY PATIENTS: Contact Interventional Radiology at [phone]) if:    1. Difficulty breathing, nausea or vomiting.    2. Chills or fever above 101 degrees F.     3. Pain at biopsy site not relieved by medication.     4. Develop any redness, swelling, heat, unusual drainage, heavy bruising or bleeding from biopsy site.

## 2024-05-15 NOTE — SEDATION DOCUMENTATION
Right lymph biopsy completed by Dr. Gallegos. Patient tolerated procedure well. Right neck site dressed with a band-aid. Ambulatory outpatient, AVS provided.

## 2024-05-15 NOTE — BRIEF OP NOTE (RAD/CATH)
INTERVENTIONAL RADIOLOGY PROCEDURE NOTE    Date: 5/15/2024    Procedure:   Procedure Summary       Date: 05/15/24 Room / Location: [facility] Interventional Radiology    Anesthesia Start:  Anesthesia Stop:     Procedure: IR BIOPSY LYMPH NODE Diagnosis:       Localized swelling, mass and lump, neck      (Right juglardiagstric lymph node with previous FNA. Core biopsy requested)    Scheduled Providers:  Responsible Provider:     Anesthesia Type: Not recorded ASA Status: Not recorded            Preoperative diagnosis:   1. Localized swelling, mass and lump, neck         Postoperative diagnosis: Same.    Surgeon: Timo Gallegos MD     Assistant: None. No qualified resident was available.    Blood loss: Minimal    Specimens: 6 18 G cores, right neck lymph node     Findings: Enlarge right cervical lymph node. 18 G core biopsy performed, 5 cores placed in formalin, 1 core placed in RPMI solution.    Complications: None immediate.    Anesthesia: local

## 2024-05-16 ENCOUNTER — TELEPHONE (OUTPATIENT)
Dept: HEMATOLOGY ONCOLOGY | Facility: CLINIC | Age: 44
End: 2024-05-16

## 2024-05-16 ENCOUNTER — OFFICE VISIT (OUTPATIENT)
Dept: HEMATOLOGY ONCOLOGY | Facility: CLINIC | Age: 44
End: 2024-05-16
Payer: COMMERCIAL

## 2024-05-16 VITALS
OXYGEN SATURATION: 98 % | HEIGHT: 62 IN | SYSTOLIC BLOOD PRESSURE: 114 MMHG | DIASTOLIC BLOOD PRESSURE: 74 MMHG | RESPIRATION RATE: 17 BRPM | TEMPERATURE: 97.9 F | WEIGHT: 120 LBS | BODY MASS INDEX: 22.08 KG/M2 | HEART RATE: 70 BPM

## 2024-05-16 DIAGNOSIS — E53.8 B12 DEFICIENCY: ICD-10-CM

## 2024-05-16 DIAGNOSIS — D50.0 IRON DEFICIENCY ANEMIA DUE TO CHRONIC BLOOD LOSS: Primary | ICD-10-CM

## 2024-05-16 DIAGNOSIS — D50.9 IRON DEFICIENCY ANEMIA, UNSPECIFIED IRON DEFICIENCY ANEMIA TYPE: ICD-10-CM

## 2024-05-16 PROCEDURE — 99244 OFF/OP CNSLTJ NEW/EST MOD 40: CPT

## 2024-05-16 RX ORDER — SODIUM CHLORIDE 9 MG/ML
20 INJECTION, SOLUTION INTRAVENOUS ONCE
OUTPATIENT
Start: 2024-06-12

## 2024-05-16 RX ORDER — CYANOCOBALAMIN 1000 UG/ML
1000 INJECTION, SOLUTION INTRAMUSCULAR; SUBCUTANEOUS ONCE
OUTPATIENT
Start: 2024-06-12 | End: 2024-06-12

## 2024-05-16 NOTE — PROGRESS NOTES
Oncology Outpatient Consult Note  Aicha Kaminski 43 y.o. female MRN: @ Encounter: [id number]        Date:  5/16/2024        CC: Iron deficiency anemia      HPI:  Aicha Kaminski is seen for initial consultation 5/16/2024 regarding iron deficiency anemia.  Patient has PMH significant for hypothyroidism.  She is here with her  today.    Patient had a CT of soft neck tissue for an enlarged lymph nodes in the jugular digastric region.  She had a fine-needle on 3/13/2024, which was gated for malignancy.  There was no improvement in the enlarged lymph node therefore, patient underwent a core biopsy on 5/15/2024, results are pending.  Patient denies any fevers, frequent infections, decreased appetite or unintentional weight loss.  No drenching night sweats.    Patient has a long history of iron deficiency anemia.  She is noted to be iron deficient since 2018.  She is status post IV iron treatments through her hematologist in [place].      Patient has a monthly menstrual cycle which last between 4 to 6 days, with the last 2 days of spotting.  Her second and third day are the heaviest where she has to change her pad/tampon 6-8 times a day.    Patient endorses mild fatigue, shortness of breath and palpitations during walks.  Patient also has RLS.  Denies dizziness, lightheadedness, PICA, chest pain.  Patient denies abnormal bleeding: epistaxis, gingival bleeding, hematuria, dark tarry stools.    Patient has been taking ferrous gluconate intermittently, recently reports she is 80% compliant.  She is not a vegetarian/vegan but reports that she do eat meat often.  Patient is a non-smoker and drinks alcohol occasionally.    Labs:  4/3/2024: Hgb 11.4, MCV 88, WBC 3.85, ANC of 2.47, platelets 255,000, serum iron 20, iron saturation 5%, ferritin 8, vitamin B12 322    Reports no family history of cancer.  Patient's mother has a history of anemia.    ROS: As stated in history of present illness otherwise her 14 point review  of systems today was negative.    ECOG PS: 0  Test Results:    Imaging: IR biopsy lymph node    Result Date: 5/15/2024  Narrative: PROCEDURE: Ultrasound-guided right cervical lymph node biopsy STAFF: Timo Gallegos M.D. NUMBER OF IMAGES: Multiple. COMPLICATIONS: None. MEDICATIONS: 1% lidocaine Moderate sedation was monitored by radiology nursing staff.  Monitoring of conscious sedation totaled 0 minutes. INDICATION: 43-year-old female with enlarging right cervical lymph node PROCEDURE: Under real-time ultrasound guidance, a 18-gauge biopsy needle was advanced into the right cervical lymph node. Under ultrasound guidance, a total of 6 core biopsies were obtained. The needle was removed and a post biopsy ultrasound demonstrated no immediate post procedure complication. FINDINGS: 1.  Pre-procedural ultrasound showed enlarged right cervical lymph node. 2.  Intra-procedural ultrasound confirmed good positioning the biopsy needle within the right cervical lymph node. 3.  Post-procedural ultrasound showed no immediate complication.     Impression: Ultrasound-guided right cervical lymph node biopsy. Workstation performed: [id number]       Labs:   Lab Results   Component Value Date    WBC 3.85 (L) 04/03/2024    HGB 11.4 (L) 04/03/2024    HCT 35.0 04/03/2024    MCV 88 04/03/2024     04/03/2024     Lab Results   Component Value Date    K 3.8 04/03/2024     04/03/2024    CO2 27 04/03/2024    BUN 16 04/03/2024    CREATININE 0.52 (L) 04/03/2024    GLUF 82 04/03/2024    CALCIUM 8.7 04/03/2024    AST 15 04/03/2024    ALT 10 04/03/2024    ALKPHOS 45 04/03/2024    EGFR 119 04/03/2024       Lab Results   Component Value Date    IRON 20 (L) 04/03/2024    TIBC 395 04/03/2024    FERRITIN 8 (L) 04/03/2024       Lab Results   Component Value Date    GQTKCUPM22 322 04/03/2024     Active Problems:   Patient Active Problem List   Diagnosis    Menorrhagia with regular cycle    B12 deficiency    Iron deficiency anemia     Hypothyroidism (acquired)    History of positive PPD    Localized swelling, mass and lump, neck    Allergic rhinitis due to allergen       Past Medical History:   Past Medical History:   Diagnosis Date    Endometriosis 2017    Hypothyroid        Surgical History:   Past Surgical History:   Procedure Laterality Date     SECTION      IR BIOPSY LYMPH NODE  5/15/2024    LAPAROSCOPIC ENDOMETRIOSIS FULGURATION      US GUIDED LYMPH NODE BIOPSY RIGHT  2024       Family History:    Family History   Problem Relation Age of Onset    Hypothyroidism Mother     Hypertension Mother     Diabetes Father     Heart disease Father        Cancer-related family history is not on file.    Social History:   Social History     Socioeconomic History    Marital status: /Civil Union     Spouse name: Not on file    Number of children: Not on file    Years of education: Not on file    Highest education level: Not on file   Occupational History    Not on file   Tobacco Use    Smoking status: Never    Smokeless tobacco: Never   Vaping Use    Vaping status: Never Used   Substance and Sexual Activity    Alcohol use: Yes     Alcohol/week: 1.0 standard drink of alcohol     Types: 1 Glasses of wine per week     Comment: socially    Drug use: Never    Sexual activity: Yes     Partners: Male     Birth control/protection: Condom Male   Other Topics Concern    Not on file   Social History Narrative    Not on file     Social Determinants of Health     Financial Resource Strain: Not on file   Food Insecurity: Not on file   Transportation Needs: Not on file   Physical Activity: Not on file   Stress: Not on file   Social Connections: Not on file   Intimate Partner Violence: Not on file   Housing Stability: Not on file       Current Medications:   Current Outpatient Medications   Medication Sig Dispense Refill    cholecalciferol (VITAMIN D3) 1,000 units tablet Take 1 tablet (1,000 Units total) by mouth daily 90 tablet 1    ferrous  gluconate (FERGON) 324 mg tablet Take 324 mg by mouth if needed      levothyroxine 75 mcg tablet TAKE 1 TABLET(75 MCG) BY MOUTH DAILY 90 tablet 0     No current facility-administered medications for this visit.       Allergies: No Known Allergies      Physical Exam:    Body surface area is 1.54 meters squared.    Wt Readings from Last 3 Encounters:   05/16/24 54.4 kg (120 lb)   03/26/24 54.4 kg (120 lb)   02/21/24 55.3 kg (122 lb)        Temp Readings from Last 3 Encounters:   05/16/24 97.9 °F (36.6 °C)   02/21/24 98.2 °F (36.8 °C)   10/02/23 (!) 96.2 °F (35.7 °C) (Tympanic)        BP Readings from Last 3 Encounters:   05/16/24 114/74   02/21/24 113/64   02/09/24 112/62         Pulse Readings from Last 3 Encounters:   05/16/24 70   02/21/24 61   10/02/23 62     @LASTSAO2(3)@    Physical Exam     Constitutional   General appearance: No acute distress, well appearing and well nourished.    Eyes   Conjunctiva and lids: No swelling, erythema or discharge.    Pupils and irises: Equal, round and reactive to light.    Ears, Nose, Mouth, and Throat   External inspection of ears and nose: Normal.    Nasal mucosa, septum, and turbinates: Normal without edema or erythema.    Oropharynx: Normal with no erythema, edema, exudate or lesions.    Pulmonary   Respiratory effort: No increased work of breathing or signs of respiratory distress.    Auscultation of lungs: Clear to auscultation.    Cardiovascular   Palpation of heart: Normal PMI, no thrills.    Auscultation of heart: Normal rate and rhythm, normal S1 and S2, without murmurs.    Examination of extremities for edema and/or varicosities: Normal.    Carotid pulses: Normal.    Abdomen   Abdomen: Non-tender, no masses.    Liver and spleen: No hepatomegaly or splenomegaly.    Lymphatic   Palpation of lymph nodes in neck: No lymphadenopathy.    Musculoskeletal   Gait and station: Normal.    Digits and nails: Normal without clubbing or cyanosis.    Inspection/palpation of joints,  bones, and muscles: Normal.    Skin   Skin and subcutaneous tissue: Normal without rashes or lesions.    Neurologic   Cranial nerves: Cranial nerves 2-12 intact.    Sensation: No sensory loss.    Psychiatric   Orientation to person, place, and time: Normal.    Mood and affect: Normal.          Assessment/ Plan:  Discussed etiologies of iron deficiency, which include blood loss, reduced iron absorption, diet, medication, bariatric surgery or malignancy.  The etiology of her iron deficiency anemia is unclear at this time.  Suspicion is that it may be related to her menstrual cycle and an element of malabsorption.  Patient has been on oral iron supplements prior to 2018 and there has been no improvement in her iron studies.    We will consider GI evaluation once she has had treatment and there is no improvement and/or we have an answer from her lymph node biopsy as well    We discussed that she would likely benefit from IV iron treatments, Venofer 300 mg weekly x 4 doses.  Patient educated about the iron product, administration and common adverse effects including but not limited to: Anaphylaxis/allergic reaction, arthralgias/myalgias, nausea; agrees to proceed with treatment.  Recommend she stop oral iron supplements when she starts IV iron treatments and resume 2 weeks after her last treatment.    Will see patient back in the office in 4 months with labs prior (CBC, iron panel).  She is agreeable with the above.  She is aware to contact us for any additional questions/concerns or worsening symptoms.      I spent 50 minutes in chart review, face to face counseling, coordination of care, and documentation.

## 2024-05-17 LAB — SCAN RESULT: NORMAL

## 2024-05-22 DIAGNOSIS — E03.9 HYPOTHYROIDISM, UNSPECIFIED TYPE: ICD-10-CM

## 2024-05-23 RX ORDER — LEVOTHYROXINE SODIUM 0.07 MG/1
75 TABLET ORAL DAILY
Qty: 90 TABLET | Refills: 1 | Status: SHIPPED | OUTPATIENT
Start: 2024-05-23

## 2024-05-31 ENCOUNTER — APPOINTMENT (OUTPATIENT)
Dept: LAB | Facility: CLINIC | Age: 44
End: 2024-05-31
Payer: COMMERCIAL

## 2024-05-31 ENCOUNTER — HOSPITAL ENCOUNTER (OUTPATIENT)
Dept: RADIOLOGY | Facility: HOSPITAL | Age: 44
Discharge: HOME | End: 2024-05-31
Attending: OTOLARYNGOLOGY
Payer: COMMERCIAL

## 2024-05-31 DIAGNOSIS — D50.0 IRON DEFICIENCY ANEMIA DUE TO CHRONIC BLOOD LOSS: ICD-10-CM

## 2024-05-31 DIAGNOSIS — C08.9: ICD-10-CM

## 2024-05-31 DIAGNOSIS — E53.8 B12 DEFICIENCY: ICD-10-CM

## 2024-05-31 LAB
ANION GAP SERPL CALCULATED.3IONS-SCNC: 5 MMOL/L (ref 4–13)
BUN SERPL-MCNC: 10 MG/DL (ref 5–25)
CALCIUM SERPL-MCNC: 9.1 MG/DL (ref 8.4–10.2)
CHLORIDE SERPL-SCNC: 106 MMOL/L (ref 96–108)
CO2 SERPL-SCNC: 27 MMOL/L (ref 21–32)
CREAT SERPL-MCNC: 0.54 MG/DL (ref 0.6–1.3)
GFR SERPL CREATININE-BSD FRML MDRD: 117 ML/MIN/1.73SQ M
GLUCOSE P FAST SERPL-MCNC: 83 MG/DL (ref 65–99)
POTASSIUM SERPL-SCNC: 4.3 MMOL/L (ref 3.5–5.3)
SODIUM SERPL-SCNC: 138 MMOL/L (ref 135–147)

## 2024-05-31 PROCEDURE — A9585 GADOBUTROL INJECTION: HCPCS | Performed by: OTOLARYNGOLOGY

## 2024-05-31 PROCEDURE — 80048 BASIC METABOLIC PNL TOTAL CA: CPT

## 2024-05-31 PROCEDURE — 70543 MRI ORBT/FAC/NCK W/O &W/DYE: CPT

## 2024-05-31 PROCEDURE — 36415 COLL VENOUS BLD VENIPUNCTURE: CPT

## 2024-05-31 RX ORDER — GADOBUTROL 604.72 MG/ML
5 INJECTION INTRAVENOUS
Status: COMPLETED | OUTPATIENT
Start: 2024-05-31 | End: 2024-05-31

## 2024-05-31 RX ADMIN — GADOBUTROL 5 ML: 604.72 INJECTION INTRAVENOUS at 16:26

## 2024-06-07 ENCOUNTER — PATIENT MESSAGE (OUTPATIENT)
Dept: INTERNAL MEDICINE CLINIC | Facility: CLINIC | Age: 44
End: 2024-06-07

## 2024-06-07 DIAGNOSIS — R22.1 NECK MASS: ICD-10-CM

## 2024-06-07 DIAGNOSIS — C08.9: Primary | ICD-10-CM

## 2024-06-12 DIAGNOSIS — E53.8 B12 DEFICIENCY: ICD-10-CM

## 2024-06-12 DIAGNOSIS — D50.0 IRON DEFICIENCY ANEMIA DUE TO CHRONIC BLOOD LOSS: Primary | ICD-10-CM

## 2024-06-12 RX ORDER — CYANOCOBALAMIN 1000 UG/ML
1000 INJECTION, SOLUTION INTRAMUSCULAR; SUBCUTANEOUS ONCE
Status: CANCELLED | OUTPATIENT
Start: 2024-06-14 | End: 2024-06-14

## 2024-06-12 RX ORDER — SODIUM CHLORIDE 9 MG/ML
20 INJECTION, SOLUTION INTRAVENOUS ONCE
Status: CANCELLED | OUTPATIENT
Start: 2024-06-14

## 2024-06-14 ENCOUNTER — HOSPITAL ENCOUNTER (OUTPATIENT)
Dept: INFUSION CENTER | Facility: HOSPITAL | Age: 44
End: 2024-06-14
Attending: INTERNAL MEDICINE
Payer: COMMERCIAL

## 2024-06-14 VITALS
HEART RATE: 60 BPM | RESPIRATION RATE: 16 BRPM | DIASTOLIC BLOOD PRESSURE: 60 MMHG | TEMPERATURE: 97.4 F | OXYGEN SATURATION: 100 % | SYSTOLIC BLOOD PRESSURE: 127 MMHG

## 2024-06-14 DIAGNOSIS — E53.8 B12 DEFICIENCY: ICD-10-CM

## 2024-06-14 DIAGNOSIS — D50.0 IRON DEFICIENCY ANEMIA DUE TO CHRONIC BLOOD LOSS: Primary | ICD-10-CM

## 2024-06-14 PROCEDURE — 96372 THER/PROPH/DIAG INJ SC/IM: CPT

## 2024-06-14 PROCEDURE — 96366 THER/PROPH/DIAG IV INF ADDON: CPT

## 2024-06-14 PROCEDURE — 96365 THER/PROPH/DIAG IV INF INIT: CPT

## 2024-06-14 RX ORDER — SODIUM CHLORIDE 9 MG/ML
20 INJECTION, SOLUTION INTRAVENOUS ONCE
Status: CANCELLED | OUTPATIENT
Start: 2024-06-21

## 2024-06-14 RX ORDER — SODIUM CHLORIDE 9 MG/ML
20 INJECTION, SOLUTION INTRAVENOUS ONCE
Status: COMPLETED | OUTPATIENT
Start: 2024-06-14 | End: 2024-06-14

## 2024-06-14 RX ORDER — CYANOCOBALAMIN 1000 UG/ML
1000 INJECTION, SOLUTION INTRAMUSCULAR; SUBCUTANEOUS ONCE
Status: CANCELLED | OUTPATIENT
Start: 2024-06-21 | End: 2024-06-16

## 2024-06-14 RX ORDER — CYANOCOBALAMIN 1000 UG/ML
1000 INJECTION, SOLUTION INTRAMUSCULAR; SUBCUTANEOUS ONCE
Status: COMPLETED | OUTPATIENT
Start: 2024-06-14 | End: 2024-06-14

## 2024-06-14 RX ADMIN — IRON SUCROSE 300 MG: 20 INJECTION, SOLUTION INTRAVENOUS at 13:25

## 2024-06-14 RX ADMIN — CYANOCOBALAMIN 1000 MCG: 1000 INJECTION, SOLUTION INTRAMUSCULAR at 13:36

## 2024-06-14 RX ADMIN — SODIUM CHLORIDE 20 ML/HR: 0.9 INJECTION, SOLUTION INTRAVENOUS at 13:24

## 2024-06-14 NOTE — PROGRESS NOTES
Patient infusion completed without complications and B12 injection administered. Patient discharged in stable condition home. AVS declined and appt on 6/21 at 1 pm verified. Patient ambulated off unit at this time.

## 2024-06-19 ENCOUNTER — HOSPITAL ENCOUNTER (OUTPATIENT)
Dept: RADIOLOGY | Age: 44
Discharge: HOME | End: 2024-06-19

## 2024-06-21 ENCOUNTER — HOSPITAL ENCOUNTER (OUTPATIENT)
Dept: INFUSION CENTER | Facility: HOSPITAL | Age: 44
End: 2024-06-21
Payer: COMMERCIAL

## 2024-06-21 VITALS
OXYGEN SATURATION: 100 % | TEMPERATURE: 97.6 F | DIASTOLIC BLOOD PRESSURE: 60 MMHG | RESPIRATION RATE: 12 BRPM | HEART RATE: 60 BPM | SYSTOLIC BLOOD PRESSURE: 110 MMHG

## 2024-06-21 DIAGNOSIS — E53.8 B12 DEFICIENCY: ICD-10-CM

## 2024-06-21 DIAGNOSIS — D50.0 IRON DEFICIENCY ANEMIA DUE TO CHRONIC BLOOD LOSS: Primary | ICD-10-CM

## 2024-06-21 PROCEDURE — 96365 THER/PROPH/DIAG IV INF INIT: CPT

## 2024-06-21 PROCEDURE — 96372 THER/PROPH/DIAG INJ SC/IM: CPT

## 2024-06-21 PROCEDURE — 96366 THER/PROPH/DIAG IV INF ADDON: CPT

## 2024-06-21 RX ORDER — CYANOCOBALAMIN 1000 UG/ML
1000 INJECTION, SOLUTION INTRAMUSCULAR; SUBCUTANEOUS ONCE
Status: COMPLETED | OUTPATIENT
Start: 2024-06-21 | End: 2024-06-21

## 2024-06-21 RX ORDER — SODIUM CHLORIDE 9 MG/ML
20 INJECTION, SOLUTION INTRAVENOUS ONCE
Status: CANCELLED | OUTPATIENT
Start: 2024-06-28

## 2024-06-21 RX ORDER — SODIUM CHLORIDE 9 MG/ML
20 INJECTION, SOLUTION INTRAVENOUS ONCE
Status: COMPLETED | OUTPATIENT
Start: 2024-06-21 | End: 2024-06-21

## 2024-06-21 RX ORDER — CYANOCOBALAMIN 1000 UG/ML
1000 INJECTION, SOLUTION INTRAMUSCULAR; SUBCUTANEOUS ONCE
Status: CANCELLED | OUTPATIENT
Start: 2024-06-28 | End: 2024-06-28

## 2024-06-21 RX ADMIN — SODIUM CHLORIDE 20 ML/HR: 9 INJECTION, SOLUTION INTRAVENOUS at 13:30

## 2024-06-21 RX ADMIN — IRON SUCROSE 300 MG: 20 INJECTION, SOLUTION INTRAVENOUS at 13:30

## 2024-06-21 RX ADMIN — CYANOCOBALAMIN 1000 MCG: 1000 INJECTION, SOLUTION INTRAMUSCULAR at 15:01

## 2024-06-21 NOTE — PROGRESS NOTES
Aicha Kaminski  tolerated treatment well with no complications.      Aicha Kaminski is aware of future appt on 6/28/2024 at 1330.     AVS printed and given to Aicha Kaminksi:    No (Declined by Aicha Kaminski)

## 2024-06-28 ENCOUNTER — HOSPITAL ENCOUNTER (OUTPATIENT)
Dept: INFUSION CENTER | Facility: HOSPITAL | Age: 44
End: 2024-06-28
Attending: INTERNAL MEDICINE

## 2024-07-01 DIAGNOSIS — D50.0 IRON DEFICIENCY ANEMIA DUE TO CHRONIC BLOOD LOSS: Primary | ICD-10-CM

## 2024-07-01 DIAGNOSIS — E53.8 B12 DEFICIENCY: ICD-10-CM

## 2024-07-01 RX ORDER — SODIUM CHLORIDE 9 MG/ML
20 INJECTION, SOLUTION INTRAVENOUS ONCE
Status: CANCELLED | OUTPATIENT
Start: 2024-07-05

## 2024-07-01 RX ORDER — CYANOCOBALAMIN 1000 UG/ML
1000 INJECTION, SOLUTION INTRAMUSCULAR; SUBCUTANEOUS ONCE
Status: CANCELLED | OUTPATIENT
Start: 2024-07-05 | End: 2024-07-05

## 2024-07-05 ENCOUNTER — HOSPITAL ENCOUNTER (OUTPATIENT)
Dept: INFUSION CENTER | Facility: HOSPITAL | Age: 44
End: 2024-07-05
Attending: INTERNAL MEDICINE
Payer: COMMERCIAL

## 2024-07-05 DIAGNOSIS — E53.8 B12 DEFICIENCY: Primary | ICD-10-CM

## 2024-07-05 DIAGNOSIS — D50.0 IRON DEFICIENCY ANEMIA DUE TO CHRONIC BLOOD LOSS: ICD-10-CM

## 2024-07-05 PROCEDURE — 96372 THER/PROPH/DIAG INJ SC/IM: CPT

## 2024-07-05 PROCEDURE — 96365 THER/PROPH/DIAG IV INF INIT: CPT

## 2024-07-05 PROCEDURE — 96366 THER/PROPH/DIAG IV INF ADDON: CPT

## 2024-07-05 RX ORDER — CYANOCOBALAMIN 1000 UG/ML
1000 INJECTION, SOLUTION INTRAMUSCULAR; SUBCUTANEOUS ONCE
OUTPATIENT
Start: 2024-07-12 | End: 2024-07-12

## 2024-07-05 RX ORDER — SODIUM CHLORIDE 9 MG/ML
20 INJECTION, SOLUTION INTRAVENOUS ONCE
Status: COMPLETED | OUTPATIENT
Start: 2024-07-05 | End: 2024-07-05

## 2024-07-05 RX ORDER — CYANOCOBALAMIN 1000 UG/ML
1000 INJECTION, SOLUTION INTRAMUSCULAR; SUBCUTANEOUS ONCE
Status: COMPLETED | OUTPATIENT
Start: 2024-07-05 | End: 2024-07-05

## 2024-07-05 RX ORDER — SODIUM CHLORIDE 9 MG/ML
20 INJECTION, SOLUTION INTRAVENOUS ONCE
OUTPATIENT
Start: 2024-07-12

## 2024-07-05 RX ADMIN — CYANOCOBALAMIN 1000 MCG: 1000 INJECTION, SOLUTION INTRAMUSCULAR at 13:46

## 2024-07-05 RX ADMIN — IRON SUCROSE 300 MG: 20 INJECTION, SOLUTION INTRAVENOUS at 13:38

## 2024-07-05 RX ADMIN — SODIUM CHLORIDE 20 ML/HR: 9 INJECTION, SOLUTION INTRAVENOUS at 13:38

## 2024-07-05 NOTE — PROGRESS NOTES
..Aicha Kaminski  tolerated treatment well with no complications.      Aicha Kaminski is aware of future appt on 7/12 at 1330.     AVS printed and given to Aicha Kaminski:  No (Declined by Aicha Kaminski)

## 2024-07-05 NOTE — PLAN OF CARE
Problem: Knowledge Deficit  Goal: Patient/family/caregiver demonstrates understanding of disease process, treatment plan, medications, and discharge instructions  Description: Complete learning assessment and assess knowledge base.  Interventions:  - Provide teaching at level of understanding  - Provide teaching via preferred learning methods  7/5/2024 1515 by Valentina Quezada, RN  Outcome: Progressing  7/5/2024 1309 by Valentina Quezada, RN  Outcome: Progressing

## 2024-07-12 ENCOUNTER — HOSPITAL ENCOUNTER (OUTPATIENT)
Dept: INFUSION CENTER | Facility: HOSPITAL | Age: 44
End: 2024-07-12
Attending: INTERNAL MEDICINE
Payer: COMMERCIAL

## 2024-07-12 DIAGNOSIS — E53.8 B12 DEFICIENCY: ICD-10-CM

## 2024-07-12 DIAGNOSIS — D50.0 IRON DEFICIENCY ANEMIA DUE TO CHRONIC BLOOD LOSS: Primary | ICD-10-CM

## 2024-07-12 PROCEDURE — 96372 THER/PROPH/DIAG INJ SC/IM: CPT

## 2024-07-12 PROCEDURE — 96365 THER/PROPH/DIAG IV INF INIT: CPT

## 2024-07-12 PROCEDURE — 96366 THER/PROPH/DIAG IV INF ADDON: CPT

## 2024-07-12 RX ORDER — CYANOCOBALAMIN 1000 UG/ML
1000 INJECTION, SOLUTION INTRAMUSCULAR; SUBCUTANEOUS ONCE
Status: COMPLETED | OUTPATIENT
Start: 2024-07-12 | End: 2024-07-12

## 2024-07-12 RX ORDER — SODIUM CHLORIDE 9 MG/ML
20 INJECTION, SOLUTION INTRAVENOUS ONCE
Status: CANCELLED | OUTPATIENT
Start: 2024-07-19

## 2024-07-12 RX ORDER — SODIUM CHLORIDE 9 MG/ML
20 INJECTION, SOLUTION INTRAVENOUS ONCE
Status: COMPLETED | OUTPATIENT
Start: 2024-07-12 | End: 2024-07-12

## 2024-07-12 RX ORDER — CYANOCOBALAMIN 1000 UG/ML
1000 INJECTION, SOLUTION INTRAMUSCULAR; SUBCUTANEOUS ONCE
Status: CANCELLED | OUTPATIENT
Start: 2024-07-19 | End: 2024-07-19

## 2024-07-12 RX ADMIN — SODIUM CHLORIDE 20 ML/HR: 9 INJECTION, SOLUTION INTRAVENOUS at 13:49

## 2024-07-12 RX ADMIN — CYANOCOBALAMIN 1000 MCG: 1000 INJECTION, SOLUTION INTRAMUSCULAR at 14:01

## 2024-07-12 RX ADMIN — IRON SUCROSE 300 MG: 20 INJECTION, SOLUTION INTRAVENOUS at 13:49

## 2024-07-12 NOTE — PROGRESS NOTES
Aicha Kaminski  tolerated treatment well with no complications.      Aicha Kaminski has no future appointments in the infusion center at this time.     AVS printed and given to Aicha Kaminski:    No (Declined by Aicha Kaminski)

## 2024-07-26 ENCOUNTER — APPOINTMENT (OUTPATIENT)
Dept: LAB | Facility: CLINIC | Age: 44
End: 2024-07-26

## 2024-07-26 DIAGNOSIS — Z00.8 HEALTH EXAMINATION IN POPULATION SURVEY: ICD-10-CM

## 2024-07-26 LAB
CHOLEST SERPL-MCNC: 181 MG/DL
EST. AVERAGE GLUCOSE BLD GHB EST-MCNC: 91 MG/DL
HBA1C MFR BLD: 4.8 %
HDLC SERPL-MCNC: 64 MG/DL
LDLC SERPL CALC-MCNC: 103 MG/DL (ref 0–100)
NONHDLC SERPL-MCNC: 117 MG/DL
TRIGL SERPL-MCNC: 69 MG/DL

## 2024-07-26 PROCEDURE — 80061 LIPID PANEL: CPT

## 2024-07-26 PROCEDURE — 83036 HEMOGLOBIN GLYCOSYLATED A1C: CPT

## 2024-07-26 PROCEDURE — 36415 COLL VENOUS BLD VENIPUNCTURE: CPT

## 2024-07-31 ENCOUNTER — HOME HEALTH ADMISSION (OUTPATIENT)
Dept: HOME HEALTH SERVICES | Facility: HOME HEALTHCARE | Age: 44
End: 2024-07-31
Payer: COMMERCIAL

## 2024-08-01 ENCOUNTER — HOME CARE VISIT (OUTPATIENT)
Dept: HOME HEALTH SERVICES | Facility: HOME HEALTHCARE | Age: 44
End: 2024-08-01
Payer: COMMERCIAL

## 2024-08-01 VITALS
HEART RATE: 69 BPM | DIASTOLIC BLOOD PRESSURE: 76 MMHG | OXYGEN SATURATION: 98 % | SYSTOLIC BLOOD PRESSURE: 114 MMHG | TEMPERATURE: 96.9 F | RESPIRATION RATE: 16 BRPM

## 2024-08-01 PROCEDURE — G0299 HHS/HOSPICE OF RN EA 15 MIN: HCPCS

## 2024-08-01 PROCEDURE — 400013 VN SOC

## 2024-08-01 NOTE — CASE COMMUNICATION
Back office please fax to ordering MD El Archer MD Fax: [phone]     Patient Aicha Kaminski  84    Medication discrepancies or Major drug interactions:   Patient is currently holding Vitamin D3 and Iron.     Abnormal clinical findings: TC to Dr. Archer left message with Kaylen Can patient use topical lidocaine patch or cream and/or oral spray/solution phenol 1.4 % or lidocaine instead of tylenol/oxycodone? Radhae nt reports lack of transportation for radiation at [facility], refused MSW.  MD aware of loss of right ear sensation. Last BM . Patient reports trouble chewing and swallowing and decreased appetite.     Response needed, please respond via Other phone [phone]  [facility] has Admitted your patient to Home Health service with the following disciplines: SN  Patient stated goals of care: Being able to speak and swallow after surgery .     Potential barriers to goal achievement: transportation    Primary focus of home health care:Hematology  Anticipated visit pattern and next visit date: 2w1 2 PRN for drain complications or FILI after surgery 24  Thank you for allowing us to participate in the care of your patient.      Vitaliy Beltran RN

## 2024-08-03 ENCOUNTER — HOME CARE VISIT (OUTPATIENT)
Dept: HOME HEALTH SERVICES | Facility: HOME HEALTHCARE | Age: 44
End: 2024-08-03
Payer: COMMERCIAL

## 2024-08-03 VITALS
TEMPERATURE: 97.9 F | OXYGEN SATURATION: 97 % | RESPIRATION RATE: 16 BRPM | SYSTOLIC BLOOD PRESSURE: 130 MMHG | DIASTOLIC BLOOD PRESSURE: 76 MMHG | HEART RATE: 80 BPM

## 2024-08-03 PROCEDURE — G0299 HHS/HOSPICE OF RN EA 15 MIN: HCPCS

## 2024-08-04 ENCOUNTER — HOME CARE VISIT (OUTPATIENT)
Dept: HOME HEALTH SERVICES | Facility: HOME HEALTHCARE | Age: 44
End: 2024-08-04
Payer: COMMERCIAL

## 2024-08-10 ENCOUNTER — HOME CARE VISIT (OUTPATIENT)
Dept: HOME HEALTH SERVICES | Facility: HOME HEALTHCARE | Age: 44
End: 2024-08-10
Payer: COMMERCIAL

## 2024-08-11 ENCOUNTER — HOME CARE VISIT (OUTPATIENT)
Dept: HOME HEALTH SERVICES | Facility: HOME HEALTHCARE | Age: 44
End: 2024-08-11
Payer: COMMERCIAL

## 2024-08-11 VITALS
SYSTOLIC BLOOD PRESSURE: 108 MMHG | TEMPERATURE: 97.6 F | HEART RATE: 56 BPM | RESPIRATION RATE: 16 BRPM | OXYGEN SATURATION: 96 % | DIASTOLIC BLOOD PRESSURE: 68 MMHG

## 2024-08-11 PROCEDURE — G0299 HHS/HOSPICE OF RN EA 15 MIN: HCPCS

## 2024-08-11 NOTE — CASE COMMUNICATION
"Medication discrepancies or Major drug interactions: n/a  Abnormal clinical findings: c/o tongue pain with talking and swallowing. C/o \"burning\" abdominal pain after med admin and TF admin    This report is informational only, no response is needed  [facility] has Resumed your patient to Home Health service with the following disciplines: SN, MSW, HHA  Patient stated goals of care: \"to swallow better\"  Potential barriers to goal ach ievement: pain, >5 medications, limited caregiver assistance (S/O unable to provide assistance 24/7 dt work), lack of adequate transportation  Primary focus of home health care:Surgical Aftercare  Anticipated visit pattern and next visit date: 2w2,1w2  Thank you for allowing us to participate in the care of your patient.      Kaya Roe      "

## 2024-08-14 ENCOUNTER — HOME CARE VISIT (OUTPATIENT)
Dept: HOME HEALTH SERVICES | Facility: HOME HEALTHCARE | Age: 44
End: 2024-08-14
Payer: COMMERCIAL

## 2024-08-14 PROCEDURE — G0155 HHCP-SVS OF CSW,EA 15 MIN: HCPCS

## 2024-08-15 ENCOUNTER — HOME CARE VISIT (OUTPATIENT)
Dept: HOME HEALTH SERVICES | Facility: HOME HEALTHCARE | Age: 44
End: 2024-08-15
Payer: COMMERCIAL

## 2024-08-15 VITALS
SYSTOLIC BLOOD PRESSURE: 122 MMHG | TEMPERATURE: 98 F | OXYGEN SATURATION: 100 % | HEART RATE: 85 BPM | RESPIRATION RATE: 16 BRPM | DIASTOLIC BLOOD PRESSURE: 70 MMHG

## 2024-08-15 PROCEDURE — G0299 HHS/HOSPICE OF RN EA 15 MIN: HCPCS

## 2024-08-20 ENCOUNTER — HOME CARE VISIT (OUTPATIENT)
Dept: HOME HEALTH SERVICES | Facility: HOME HEALTHCARE | Age: 44
End: 2024-08-20
Payer: COMMERCIAL

## 2024-08-20 VITALS
OXYGEN SATURATION: 100 % | DIASTOLIC BLOOD PRESSURE: 58 MMHG | SYSTOLIC BLOOD PRESSURE: 100 MMHG | TEMPERATURE: 98.2 F | HEART RATE: 72 BPM

## 2024-08-20 PROCEDURE — G0299 HHS/HOSPICE OF RN EA 15 MIN: HCPCS

## 2024-08-22 ENCOUNTER — HOME CARE VISIT (OUTPATIENT)
Dept: HOME HEALTH SERVICES | Facility: HOME HEALTHCARE | Age: 44
End: 2024-08-22
Payer: COMMERCIAL

## 2024-08-22 VITALS
DIASTOLIC BLOOD PRESSURE: 56 MMHG | SYSTOLIC BLOOD PRESSURE: 96 MMHG | TEMPERATURE: 98.3 F | HEART RATE: 71 BPM | OXYGEN SATURATION: 99 %

## 2024-08-22 PROCEDURE — G0299 HHS/HOSPICE OF RN EA 15 MIN: HCPCS

## 2024-08-28 ENCOUNTER — APPOINTMENT (OUTPATIENT)
Dept: LAB | Facility: CLINIC | Age: 44
End: 2024-08-28
Payer: COMMERCIAL

## 2024-08-28 DIAGNOSIS — D50.0 IRON DEFICIENCY ANEMIA DUE TO CHRONIC BLOOD LOSS: ICD-10-CM

## 2024-08-28 LAB
ERYTHROCYTE [DISTWIDTH] IN BLOOD BY AUTOMATED COUNT: 14.3 % (ref 11.6–15.1)
FERRITIN SERPL-MCNC: 121 NG/ML (ref 11–307)
HCT VFR BLD AUTO: 37.6 % (ref 34.8–46.1)
HGB BLD-MCNC: 12.5 G/DL (ref 11.5–15.4)
IRON SATN MFR SERPL: 30 % (ref 15–50)
IRON SERPL-MCNC: 83 UG/DL (ref 50–212)
MCH RBC QN AUTO: 30.3 PG (ref 26.8–34.3)
MCHC RBC AUTO-ENTMCNC: 33.2 G/DL (ref 31.4–37.4)
MCV RBC AUTO: 91 FL (ref 82–98)
PLATELET # BLD AUTO: 218 THOUSANDS/UL (ref 149–390)
PMV BLD AUTO: 9.9 FL (ref 8.9–12.7)
RBC # BLD AUTO: 4.13 MILLION/UL (ref 3.81–5.12)
TIBC SERPL-MCNC: 279 UG/DL (ref 250–450)
UIBC SERPL-MCNC: 196 UG/DL (ref 155–355)
VIT B12 SERPL-MCNC: 464 PG/ML (ref 180–914)
WBC # BLD AUTO: 4.46 THOUSAND/UL (ref 4.31–10.16)

## 2024-08-28 PROCEDURE — 83540 ASSAY OF IRON: CPT

## 2024-08-28 PROCEDURE — 82728 ASSAY OF FERRITIN: CPT

## 2024-08-28 PROCEDURE — 82607 VITAMIN B-12: CPT

## 2024-08-28 PROCEDURE — 85027 COMPLETE CBC AUTOMATED: CPT

## 2024-08-28 PROCEDURE — 83550 IRON BINDING TEST: CPT

## 2024-09-17 ENCOUNTER — PATIENT MESSAGE (OUTPATIENT)
Age: 44
End: 2024-09-17

## 2024-10-02 ENCOUNTER — ANNUAL EXAM (OUTPATIENT)
Age: 44
End: 2024-10-02
Payer: COMMERCIAL

## 2024-10-02 VITALS
DIASTOLIC BLOOD PRESSURE: 64 MMHG | HEIGHT: 62 IN | WEIGHT: 122.2 LBS | BODY MASS INDEX: 22.49 KG/M2 | SYSTOLIC BLOOD PRESSURE: 104 MMHG

## 2024-10-02 DIAGNOSIS — Z12.4 SCREENING FOR CERVICAL CANCER: ICD-10-CM

## 2024-10-02 DIAGNOSIS — D25.2 INTRAMURAL AND SUBSEROUS LEIOMYOMA OF UTERUS: ICD-10-CM

## 2024-10-02 DIAGNOSIS — C01 CARCINOMA OF BASE OF TONGUE (HCC): ICD-10-CM

## 2024-10-02 DIAGNOSIS — Z01.419 ENCOUNTER FOR ANNUAL ROUTINE GYNECOLOGICAL EXAMINATION: Primary | ICD-10-CM

## 2024-10-02 DIAGNOSIS — Z11.51 SCREENING FOR HUMAN PAPILLOMAVIRUS (HPV): ICD-10-CM

## 2024-10-02 DIAGNOSIS — D25.1 INTRAMURAL AND SUBSEROUS LEIOMYOMA OF UTERUS: ICD-10-CM

## 2024-10-02 DIAGNOSIS — Z92.3 HISTORY OF RADIATION TO HEAD AND NECK REGION: ICD-10-CM

## 2024-10-02 DIAGNOSIS — Z12.31 ENCOUNTER FOR SCREENING MAMMOGRAM FOR MALIGNANT NEOPLASM OF BREAST: ICD-10-CM

## 2024-10-02 PROBLEM — C14.0 PHARYNGEAL CANCER (HCC): Status: ACTIVE | Noted: 2024-08-08

## 2024-10-02 PROBLEM — D25.9 FIBROID UTERUS: Status: ACTIVE | Noted: 2022-02-10

## 2024-10-02 PROBLEM — C77.9 METASTASIS TO LYMPH NODES (HCC): Status: ACTIVE | Noted: 2024-10-02

## 2024-10-02 PROBLEM — R22.1 LOCALIZED SWELLING, MASS AND LUMP, NECK: Status: RESOLVED | Noted: 2024-02-21 | Resolved: 2024-10-02

## 2024-10-02 PROCEDURE — G0145 SCR C/V CYTO,THINLAYER,RESCR: HCPCS | Performed by: OBSTETRICS & GYNECOLOGY

## 2024-10-02 PROCEDURE — S0612 ANNUAL GYNECOLOGICAL EXAMINA: HCPCS | Performed by: OBSTETRICS & GYNECOLOGY

## 2024-10-02 PROCEDURE — G0476 HPV COMBO ASSAY CA SCREEN: HCPCS | Performed by: OBSTETRICS & GYNECOLOGY

## 2024-10-02 NOTE — PROGRESS NOTES
Assessment/Plan:    1. Encounter for annual routine gynecological examination      2. Screening for cervical cancer    - Liquid-based pap, screening    3. Screening for human papillomavirus (HPV)    - Liquid-based pap, screening    4. History of radiation to head and neck region    - Mammo diagnostic bilateral w 3d and cad; Future    5. Encounter for screening mammogram for malignant neoplasm of breast    - Mammo diagnostic bilateral w 3d and cad; Future    6. 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 Intramural and subserous leiomyoma of uterus    - transvaginal pelvic sono 2024    Follow up 2024 to discuss fibroids    Subjective      Aicha Kaminski is a 43 y.o. female who presents for annual exam. Periods are regular and flow is manageable.  She has not been sexually active recently.  Do not do sono due to CA diagnosis.  PET scan show findings c/w fibroid uterus and mild hydronephrosis from compression.  She has gone through  XRT treatments.  She denies any breast concerns.  Reports urinary frequency due to drinking more water (increased thirst with treatment).    Current contraception: condoms  History of abnormal Pap smear: no  Regular self breast exam: yes  History of abnormal mammogram: no  History of abnormal lipids: no    Menstrual History:    Menarche age: 14  Patient's last menstrual period was 2024 (exact date).  Period Cycle (Days):  (24-28)  Period Duration (Days): 5  Period Pattern: Regular  Menstrual Flow: Heavy (heavy bleeding x2-3 days)  Menstrual Control: Maxi pad  Menstrual Control Change Freq (Hours): 2  Dysmenorrhea: (!) Moderate  Dysmenorrhea Symptoms: Cramping, Other (Comment) (Back pain, bloating, breast tenderness)    Past Medical History:   Diagnosis Date    Cancer of base of tongue (HCC) 2024    Endometriosis 2017    Fibroid 2023    Hypothyroid 2011       Family History   Problem Relation Age of Onset    Hypothyroidism Mother   "   Hypertension Mother     Diabetes Father     Heart disease Father        The following portions of the patient's history were reviewed and updated as appropriate: allergies, current medications, past family history, past medical history, past social history, past surgical history, and problem list.    Review of Systems  Pertinent items are noted in HPI.      Objective      /64 (BP Location: Right arm, Patient Position: Sitting, Cuff Size: Large)   Ht 5' 1.5\" (1.562 m)   Wt 55.4 kg (122 lb 3.2 oz)   LMP 09/22/2024 (Exact Date)   BMI 22.72 kg/m²     General:   alert and oriented, in no acute distress   Heart:  Breasts: regular rate and rhythm  appear normal, no suspicious masses, no skin or nipple changes or axillary nodes.   Lungs: Effort normal   Abdomen: soft, non-tender, without masses or organomegaly   Vulva: normal   Vagina: normal mucosa   Cervix: no lesions   Uterus: bulky, mobile, non-tender, size consistent with 12 weeks   Adnexa:  Bladder: normal adnexa and no mass, fullness, tenderness  Non-tender, no prolapse               "

## 2024-10-03 LAB
HPV HR 12 DNA CVX QL NAA+PROBE: NEGATIVE
HPV16 DNA CVX QL NAA+PROBE: NEGATIVE
HPV18 DNA CVX QL NAA+PROBE: NEGATIVE

## 2024-10-09 LAB
LAB AP GYN PRIMARY INTERPRETATION: NORMAL
LAB AP LMP: NORMAL
Lab: NORMAL
PATH INTERP SPEC-IMP: NORMAL

## 2024-11-23 DIAGNOSIS — E03.9 HYPOTHYROIDISM, UNSPECIFIED TYPE: ICD-10-CM

## 2024-11-25 RX ORDER — LEVOTHYROXINE SODIUM 75 UG/1
75 TABLET ORAL DAILY
Qty: 90 TABLET | Refills: 0 | Status: SHIPPED | OUTPATIENT
Start: 2024-11-25

## 2024-12-10 DIAGNOSIS — D50.0 IRON DEFICIENCY ANEMIA DUE TO CHRONIC BLOOD LOSS: Primary | ICD-10-CM

## 2024-12-10 DIAGNOSIS — D50.9 IRON DEFICIENCY ANEMIA, UNSPECIFIED IRON DEFICIENCY ANEMIA TYPE: ICD-10-CM

## 2024-12-11 ENCOUNTER — PATIENT MESSAGE (OUTPATIENT)
Dept: INTERNAL MEDICINE CLINIC | Facility: CLINIC | Age: 44
End: 2024-12-11

## 2024-12-11 ENCOUNTER — TELEPHONE (OUTPATIENT)
Age: 44
End: 2024-12-11

## 2024-12-11 DIAGNOSIS — R79.89 ELEVATED TSH: Primary | ICD-10-CM

## 2024-12-11 NOTE — TELEPHONE ENCOUNTER
Left voicemail as a reminder to complete blood work prior to appointment with Kim on 12/16. Stated orders are in the chart and are non-fasting. Advised if she goes to a lab outside of [facility] to let us know so we can get the results. Provided call back number for questions.

## 2024-12-13 ENCOUNTER — APPOINTMENT (OUTPATIENT)
Dept: LAB | Facility: CLINIC | Age: 44
End: 2024-12-13
Payer: COMMERCIAL

## 2024-12-13 DIAGNOSIS — D50.0 IRON DEFICIENCY ANEMIA DUE TO CHRONIC BLOOD LOSS: ICD-10-CM

## 2024-12-13 DIAGNOSIS — D50.9 IRON DEFICIENCY ANEMIA, UNSPECIFIED IRON DEFICIENCY ANEMIA TYPE: ICD-10-CM

## 2024-12-13 LAB
BASOPHILS # BLD AUTO: 0.03 THOUSANDS/ÂΜL (ref 0–0.1)
BASOPHILS NFR BLD AUTO: 1 % (ref 0–1)
EOSINOPHIL # BLD AUTO: 0.13 THOUSAND/ÂΜL (ref 0–0.61)
EOSINOPHIL NFR BLD AUTO: 4 % (ref 0–6)
ERYTHROCYTE [DISTWIDTH] IN BLOOD BY AUTOMATED COUNT: 12.6 % (ref 11.6–15.1)
FERRITIN SERPL-MCNC: 13 NG/ML (ref 11–307)
HCT VFR BLD AUTO: 39.8 % (ref 34.8–46.1)
HGB BLD-MCNC: 13.2 G/DL (ref 11.5–15.4)
IMM GRANULOCYTES # BLD AUTO: 0.01 THOUSAND/UL (ref 0–0.2)
IMM GRANULOCYTES NFR BLD AUTO: 0 % (ref 0–2)
IRON SATN MFR SERPL: 12 % (ref 15–50)
IRON SERPL-MCNC: 42 UG/DL (ref 50–212)
LYMPHOCYTES # BLD AUTO: 0.52 THOUSANDS/ÂΜL (ref 0.6–4.47)
LYMPHOCYTES NFR BLD AUTO: 14 % (ref 14–44)
MCH RBC QN AUTO: 29.2 PG (ref 26.8–34.3)
MCHC RBC AUTO-ENTMCNC: 33.2 G/DL (ref 31.4–37.4)
MCV RBC AUTO: 88 FL (ref 82–98)
MONOCYTES # BLD AUTO: 0.32 THOUSAND/ÂΜL (ref 0.17–1.22)
MONOCYTES NFR BLD AUTO: 9 % (ref 4–12)
NEUTROPHILS # BLD AUTO: 2.64 THOUSANDS/ÂΜL (ref 1.85–7.62)
NEUTS SEG NFR BLD AUTO: 72 % (ref 43–75)
NRBC BLD AUTO-RTO: 0 /100 WBCS
PLATELET # BLD AUTO: 238 THOUSANDS/UL (ref 149–390)
PMV BLD AUTO: 10.6 FL (ref 8.9–12.7)
RBC # BLD AUTO: 4.52 MILLION/UL (ref 3.81–5.12)
TIBC SERPL-MCNC: 356 UG/DL (ref 250–450)
UIBC SERPL-MCNC: 314 UG/DL (ref 155–355)
WBC # BLD AUTO: 3.65 THOUSAND/UL (ref 4.31–10.16)

## 2024-12-13 PROCEDURE — 82728 ASSAY OF FERRITIN: CPT

## 2024-12-13 PROCEDURE — 36415 COLL VENOUS BLD VENIPUNCTURE: CPT

## 2024-12-13 PROCEDURE — 83550 IRON BINDING TEST: CPT

## 2024-12-13 PROCEDURE — 85025 COMPLETE CBC W/AUTO DIFF WBC: CPT

## 2024-12-13 PROCEDURE — 83540 ASSAY OF IRON: CPT

## 2024-12-16 ENCOUNTER — TELEPHONE (OUTPATIENT)
Age: 44
End: 2024-12-16

## 2024-12-18 ENCOUNTER — TELEPHONE (OUTPATIENT)
Dept: HEMATOLOGY ONCOLOGY | Facility: CLINIC | Age: 44
End: 2024-12-18

## 2024-12-18 ENCOUNTER — OFFICE VISIT (OUTPATIENT)
Dept: HEMATOLOGY ONCOLOGY | Facility: CLINIC | Age: 44
End: 2024-12-18
Payer: COMMERCIAL

## 2024-12-18 VITALS
HEIGHT: 62 IN | TEMPERATURE: 97.8 F | HEART RATE: 64 BPM | SYSTOLIC BLOOD PRESSURE: 110 MMHG | OXYGEN SATURATION: 100 % | RESPIRATION RATE: 18 BRPM | BODY MASS INDEX: 23.19 KG/M2 | WEIGHT: 126 LBS | DIASTOLIC BLOOD PRESSURE: 72 MMHG

## 2024-12-18 DIAGNOSIS — D50.0 IRON DEFICIENCY ANEMIA DUE TO CHRONIC BLOOD LOSS: Primary | ICD-10-CM

## 2024-12-18 DIAGNOSIS — D53.9 NUTRITIONAL ANEMIA: ICD-10-CM

## 2024-12-18 PROCEDURE — 99214 OFFICE O/P EST MOD 30 MIN: CPT

## 2024-12-18 RX ORDER — FERROUS SULFATE 7.5 MG/0.5
15 SYRINGE (EA) ORAL DAILY
Qty: 50 ML | Refills: 1 | Status: SHIPPED | OUTPATIENT
Start: 2024-12-18

## 2024-12-18 RX ORDER — PREGABALIN 75 MG/1
75 CAPSULE ORAL DAILY
COMMUNITY

## 2024-12-18 RX ORDER — SODIUM CHLORIDE 9 MG/ML
20 INJECTION, SOLUTION INTRAVENOUS ONCE
OUTPATIENT
Start: 2025-01-07

## 2024-12-18 NOTE — ASSESSMENT & PLAN NOTE
Orders:  •  ferrous sulfate (RHIANNON-IN-SOL) 75 (15 Fe) mg/mL drops; Take 1 mL (15 mg of iron total) by mouth daily By mixing in small amount of juice

## 2024-12-18 NOTE — PROGRESS NOTES
Name: Aicha Kaminski      : 1980      MRN: [id number]  Encounter Provider: TABATHA French  Encounter Date: 2024   Encounter department: [facility] [place]  :  Assessment & Plan  Iron deficiency anemia due to chronic blood loss    Orders:  •  ferrous sulfate (RHIANNON-IN-SOL) 75 (15 Fe) mg/mL drops; Take 1 mL (15 mg of iron total) by mouth daily By mixing in small amount of juice    Nutritional anemia    Orders:  •  Vitamin B12; Future  •  Folate; Future    44-year-old female with iron deficiency.  Patient will likely benefit from another round of IV iron treatments.  We will start in January as patient is still recovering from her cancer treatment.  We will proceed with Venofer 300 mg every two weeks x 4 doses.  We will also check her vitamin B12 and folate.       Patient has poor peripheral access, recommend access obtained using ultrasound guidance with an experienced RN per patient request.  I will put communication in the orders as well.      Recommend patient trial ferrous sulfate solution, 15 ml (1mg) daily by mixing in small amount of juice.      We will see patient back in 4 months with labs prior (CBCD, Iron Panel, Vitamin B12, folate).  She is agreeable with the plan.  Patient aware to contact us for worsening symptoms or for additional questions/concerns.      History of Present Illness   Chief Complaint   Patient presents with   • Follow-up   Aicha Kaminski is a 44 y.o. female with iron deficiency anemia.      Interim history:  Patient presents for follow-up of her iron deficiency anemia.  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  She is status post right neck dissection.  She completed radiation treatment about 6 weeks ago.  Patient is able to tolerate oral intake.      Patient is status post IV iron treatment, Venofer 300 mg x 4 doses  (6/14/2024 to 7/12/2024).  Since then she has been in treatment for her newly diagnosed secretory carcinoma of the right base of tongue.  She is not taking iron supplements.    Patient endorses fatigue.  Denies any dizziness, lightheadedness, CP, SOB, palpitations.  Patient denies abnormal bleeding: epistaxis, gingival bleeding, hematuria, dark tarry stools.    Until last month, she was having menstrual cycle every 15 days, last cycle was about a month.  The cycle lasts about 6-7 days.      Pertinent Medical History   Aicha Kaminski was seen for initial consultation 5/16/2024 regarding iron deficiency anemia.  Patient has PMH significant for hypothyroidism.  She is here with her  today.     Patient had a CT of soft neck tissue for an enlarged lymph nodes in the jugular digastric region.  She had a fine-needle on 3/13/2024, which was gated for malignancy.  There was no improvement in the enlarged lymph node therefore, patient underwent a core biopsy on 5/15/2024, results are pending.  Patient denies any fevers, frequent infections, decreased appetite or unintentional weight loss.  No drenching night sweats.    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  Patient is status post RT, 33 fractions about 6 weeks ago.  She was treated at [facility].  She will have a follow up scan in February.     Patient has a long history of iron deficiency anemia.  She is noted to be iron deficient since 2018.  She is status post IV iron treatments through her hematologist in [place].      Patient has a monthly menstrual cycle which last between 4 to 6 days, with the last 2 days of spotting.  Her second and third day are the heaviest where she has to change her pad/tampon 6-8 times a day.    Treatment:  Venofer  "300 mg x 4 doses - 6/14/2024 to 7/12/2024      Review of Systems   Constitutional:  Positive for fatigue. Negative for chills and fever.   HENT: Negative.     Eyes: Negative.    Respiratory: Negative.     Cardiovascular: Negative.    Gastrointestinal: Negative.    Endocrine: Negative.    Genitourinary: Negative.    Musculoskeletal: Negative.    Allergic/Immunologic: Negative.    Neurological: Negative.    Hematological: Negative.    Psychiatric/Behavioral: Negative.             Objective   /72 (BP Location: Left arm, Patient Position: Sitting, Cuff Size: Adult)   Pulse 64   Temp 97.8 °F (36.6 °C) (Temporal)   Resp 18   Ht 5' 1.5\" (1.562 m)   Wt 57.2 kg (126 lb)   SpO2 100%   BMI 23.42 kg/m²     Physical Exam  Constitutional:       Appearance: Normal appearance.   HENT:      Head: Normocephalic and atraumatic.   Cardiovascular:      Rate and Rhythm: Regular rhythm.      Heart sounds: Normal heart sounds.   Pulmonary:      Breath sounds: Normal breath sounds.   Musculoskeletal:      Cervical back: Normal range of motion.      Right lower leg: No edema.      Left lower leg: No edema.   Skin:     General: Skin is warm and dry.   Neurological:      Mental Status: She is alert and oriented to person, place, and time.   Psychiatric:         Mood and Affect: Mood normal.         Behavior: Behavior normal.         Thought Content: Thought content normal.         Judgment: Judgment normal.         Labs: I have reviewed the following labs:  Results for orders placed or performed in visit on 12/13/24   CBC and differential   Result Value Ref Range    WBC 3.65 (L) 4.31 - 10.16 Thousand/uL    RBC 4.52 3.81 - 5.12 Million/uL    Hemoglobin 13.2 11.5 - 15.4 g/dL    Hematocrit 39.8 34.8 - 46.1 %    MCV 88 82 - 98 fL    MCH 29.2 26.8 - 34.3 pg    MCHC 33.2 31.4 - 37.4 g/dL    RDW 12.6 11.6 - 15.1 %    MPV 10.6 8.9 - 12.7 fL    Platelets 238 149 - 390 Thousands/uL    nRBC 0 /100 WBCs    Segmented % 72 43 - 75 %    Immature " Grans % 0 0 - 2 %    Lymphocytes % 14 14 - 44 %    Monocytes % 9 4 - 12 %    Eosinophils Relative 4 0 - 6 %    Basophils Relative 1 0 - 1 %    Absolute Neutrophils 2.64 1.85 - 7.62 Thousands/µL    Absolute Immature Grans 0.01 0.00 - 0.20 Thousand/uL    Absolute Lymphocytes 0.52 (L) 0.60 - 4.47 Thousands/µL    Absolute Monocytes 0.32 0.17 - 1.22 Thousand/µL    Eosinophils Absolute 0.13 0.00 - 0.61 Thousand/µL    Basophils Absolute 0.03 0.00 - 0.10 Thousands/µL   TIBC Panel (incl. Iron, TIBC, % Iron Saturation)   Result Value Ref Range    Iron Saturation 12 (L) 15 - 50 %    TIBC 356 250 - 450 ug/dL    Iron 42 (L) 50 - 212 ug/dL    UIBC 314 155 - 355 ug/dL   Result Value Ref Range    Ferritin 13 11 - 307 ng/mL     I spent 30 minutes in chart review, counseling, coordination of care, and documentation.    This note has been generated by voice recognition software system.  Therefore, there may be spelling, grammar, and or syntax errors. Please contact if questions arise.

## 2024-12-30 ENCOUNTER — OFFICE VISIT (OUTPATIENT)
Dept: ENDOCRINOLOGY | Facility: CLINIC | Age: 44
End: 2024-12-30
Payer: COMMERCIAL

## 2024-12-30 VITALS
OXYGEN SATURATION: 99 % | DIASTOLIC BLOOD PRESSURE: 68 MMHG | HEART RATE: 65 BPM | HEIGHT: 62 IN | WEIGHT: 125.4 LBS | SYSTOLIC BLOOD PRESSURE: 112 MMHG | BODY MASS INDEX: 23.08 KG/M2

## 2024-12-30 DIAGNOSIS — E55.9 VITAMIN D DEFICIENCY: ICD-10-CM

## 2024-12-30 DIAGNOSIS — R79.89 ELEVATED TSH: ICD-10-CM

## 2024-12-30 DIAGNOSIS — E03.9 HYPOTHYROIDISM (ACQUIRED): Primary | ICD-10-CM

## 2024-12-30 DIAGNOSIS — E03.9 HYPOTHYROIDISM, UNSPECIFIED TYPE: ICD-10-CM

## 2024-12-30 PROCEDURE — 99244 OFF/OP CNSLTJ NEW/EST MOD 40: CPT | Performed by: INTERNAL MEDICINE

## 2024-12-30 RX ORDER — ERGOCALCIFEROL 1.25 MG/1
50000 CAPSULE ORAL 2 TIMES WEEKLY
Qty: 14 CAPSULE | Refills: 0 | Status: SHIPPED | OUTPATIENT
Start: 2024-12-30 | End: 2025-02-14

## 2024-12-30 RX ORDER — LEVOTHYROXINE SODIUM 88 UG/1
88 TABLET ORAL DAILY
Qty: 100 TABLET | Refills: 3 | Status: SHIPPED | OUTPATIENT
Start: 2024-12-30 | End: 2026-02-03

## 2024-12-30 NOTE — ASSESSMENT & PLAN NOTE
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  Recommend increasing levothyroxine to 88 mcg/day.  Check TSH with reflex to free T4 in 6 weeks.  The target TSH is between 1-2.

## 2024-12-30 NOTE — ASSESSMENT & PLAN NOTE
Start ergocalciferol 50,000 units 2 times per week for total of 14 doses.  Once finished, recommend taking 2000 units/day.  Check vitamin D level in 6 months.    Orders:    ergocalciferol (ERGOCALCIFEROL) 1.25 MG (98488 UT) capsule; Take 1 capsule (50,000 Units total) by mouth 2 (two) times a week for 14 doses

## 2024-12-30 NOTE — PROGRESS NOTES
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  Recommend increasing levothyroxine to 88 mcg/day.  Check TSH with reflex to free T4 in 6 weeks.  The target TSH is between 1-2.         Vitamin D deficiency  Start ergocalciferol 50,000 units 2 times per week for total of 14 doses.  Once finished, recommend taking 2000 units/day.  Check vitamin D level in 6 months.    Orders:    ergocalciferol (ERGOCALCIFEROL) 1.25 MG (04485 UT) capsule; Take 1 capsule (50,000 Units total) by mouth 2 (two) times a week for 14 doses        History of Present Illness   HPI  Aicha Kaminski is a 44 y.o. female who presents for consultation related to hypothyroidism.  She was diagnosed with hypothyroidism 13 years ago during pregnancy.  She has been on a stable dose of levothyroxine 75 mcg/day.  More recently, she underwent radiation therapy for type IVb secretory carcinoma of the base of the tongue.  Subsequently, she had an elevated TSH of greater than 5.  She states that she has been having some irregular menses and constipation both of which have somewhat improved.  She does admit to cold intolerance.  She has some joint stiffness.    History obtained from: patient    Review of Systems   Constitutional:  Negative for chills and fever.   Respiratory:  Negative for shortness of breath.    Cardiovascular:  Negative for chest pain.  "  Gastrointestinal:  Negative for constipation, diarrhea, nausea and vomiting.   Endocrine: Positive for cold intolerance. Negative for heat intolerance.   All other systems reviewed and are negative.    Current Outpatient Medications on File Prior to Visit   Medication Sig Dispense Refill    ferrous sulfate (RHIANNON-IN-SOL) 75 (15 Fe) mg/mL drops Take 1 mL (15 mg of iron total) by mouth daily By mixing in small amount of juice 50 mL 1    pregabalin (LYRICA) 75 mg capsule Take 75 mg by mouth daily      [DISCONTINUED] levothyroxine 75 mcg tablet TAKE 1 TABLET(75 MCG) BY MOUTH DAILY 90 tablet 0    ferrous gluconate (FERGON) 324 mg tablet 324 mg by Per G Tube route if needed (Iron Deficiency anemia) currently not taking (Patient not taking: Reported on 10/2/2024)       No current facility-administered medications on file prior to visit.         Objective   /68   Pulse 65   Ht 5' 1.5\" (1.562 m)   Wt 56.9 kg (125 lb 6.4 oz)   SpO2 99%   BMI 23.31 kg/m²      Physical Exam  Vitals and nursing note reviewed.   Constitutional:       Appearance: Normal appearance.   HENT:      Head: Normocephalic and atraumatic.   Eyes:      General: No scleral icterus.        Right eye: No discharge.         Left eye: No discharge.   Pulmonary:      Effort: Pulmonary effort is normal.   Musculoskeletal:         General: Normal range of motion.      Cervical back: Normal range of motion.   Skin:     Coloration: Skin is not jaundiced.      Comments: She does have some darkening of her skin in the radiation area around the neck.   Neurological:      General: No focal deficit present.      Mental Status: She is alert and oriented to person, place, and time.      Cranial Nerves: No cranial nerve deficit.   Psychiatric:         Mood and Affect: Mood normal.         Behavior: Behavior normal.           "

## 2025-01-15 ENCOUNTER — HOSPITAL ENCOUNTER (OUTPATIENT)
Dept: INFUSION CENTER | Facility: HOSPITAL | Age: 45
Discharge: HOME | End: 2025-01-15
Attending: INTERNAL MEDICINE
Payer: COMMERCIAL

## 2025-01-15 VITALS
HEART RATE: 74 BPM | OXYGEN SATURATION: 100 % | TEMPERATURE: 97.4 F | SYSTOLIC BLOOD PRESSURE: 106 MMHG | RESPIRATION RATE: 18 BRPM | DIASTOLIC BLOOD PRESSURE: 72 MMHG

## 2025-01-15 DIAGNOSIS — D53.9 NUTRITIONAL ANEMIA: Primary | ICD-10-CM

## 2025-01-15 RX ORDER — SODIUM CHLORIDE 9 MG/ML
20 INJECTION, SOLUTION INTRAVENOUS ONCE
OUTPATIENT
Start: 2025-01-29

## 2025-01-15 RX ORDER — SODIUM CHLORIDE 9 MG/ML
20 INJECTION, SOLUTION INTRAVENOUS ONCE
Status: COMPLETED | OUTPATIENT
Start: 2025-01-15 | End: 2025-01-15

## 2025-01-15 RX ADMIN — IRON SUCROSE 300 MG: 20 INJECTION, SOLUTION INTRAVENOUS at 13:23

## 2025-01-15 RX ADMIN — SODIUM CHLORIDE 20 ML/HR: 9 INJECTION, SOLUTION INTRAVENOUS at 13:23

## 2025-01-15 NOTE — PROGRESS NOTES
Aicha Kaminski  tolerated treatment well with no complications.      Aicha Kaminski is aware of future appt on 1/29 at 1300.     AVS printed and given to Aicha Kaminski:  No (Declined by Aicha Kaminski)

## 2025-01-29 ENCOUNTER — HOSPITAL ENCOUNTER (OUTPATIENT)
Dept: INFUSION CENTER | Facility: HOSPITAL | Age: 45
Discharge: HOME | End: 2025-01-29
Attending: INTERNAL MEDICINE
Payer: COMMERCIAL

## 2025-01-29 VITALS
SYSTOLIC BLOOD PRESSURE: 108 MMHG | HEART RATE: 62 BPM | DIASTOLIC BLOOD PRESSURE: 65 MMHG | TEMPERATURE: 97.8 F | RESPIRATION RATE: 18 BRPM

## 2025-01-29 DIAGNOSIS — D53.9 NUTRITIONAL ANEMIA: Primary | ICD-10-CM

## 2025-01-29 PROCEDURE — 96366 THER/PROPH/DIAG IV INF ADDON: CPT

## 2025-01-29 PROCEDURE — 96365 THER/PROPH/DIAG IV INF INIT: CPT

## 2025-01-29 RX ORDER — SODIUM CHLORIDE 9 MG/ML
20 INJECTION, SOLUTION INTRAVENOUS ONCE
Status: CANCELLED | OUTPATIENT
Start: 2025-02-12

## 2025-01-29 RX ORDER — SODIUM CHLORIDE 9 MG/ML
20 INJECTION, SOLUTION INTRAVENOUS ONCE
Status: COMPLETED | OUTPATIENT
Start: 2025-01-29 | End: 2025-01-29

## 2025-01-29 RX ADMIN — SODIUM CHLORIDE 20 ML/HR: 9 INJECTION, SOLUTION INTRAVENOUS at 13:20

## 2025-01-29 RX ADMIN — IRON SUCROSE 300 MG: 20 INJECTION, SOLUTION INTRAVENOUS at 13:20

## 2025-01-29 NOTE — PROGRESS NOTES
Pt tolerated treatment with no adv reactions; pt aware of next appt 2/13 at 1300; left unit ambulatory with steady gait.

## 2025-02-13 ENCOUNTER — HOSPITAL ENCOUNTER (OUTPATIENT)
Dept: INFUSION CENTER | Facility: HOSPITAL | Age: 45
Discharge: HOME | End: 2025-02-13
Attending: INTERNAL MEDICINE
Payer: COMMERCIAL

## 2025-02-13 VITALS
DIASTOLIC BLOOD PRESSURE: 61 MMHG | HEART RATE: 82 BPM | RESPIRATION RATE: 16 BRPM | SYSTOLIC BLOOD PRESSURE: 97 MMHG | TEMPERATURE: 97.4 F

## 2025-02-13 DIAGNOSIS — D53.9 NUTRITIONAL ANEMIA: Primary | ICD-10-CM

## 2025-02-13 PROCEDURE — 96366 THER/PROPH/DIAG IV INF ADDON: CPT

## 2025-02-13 PROCEDURE — 96365 THER/PROPH/DIAG IV INF INIT: CPT

## 2025-02-13 RX ORDER — SODIUM CHLORIDE 9 MG/ML
20 INJECTION, SOLUTION INTRAVENOUS ONCE
OUTPATIENT
Start: 2025-02-27

## 2025-02-13 RX ORDER — SODIUM CHLORIDE 9 MG/ML
20 INJECTION, SOLUTION INTRAVENOUS ONCE
Status: COMPLETED | OUTPATIENT
Start: 2025-02-13 | End: 2025-02-13

## 2025-02-13 RX ADMIN — IRON SUCROSE 300 MG: 20 INJECTION, SOLUTION INTRAVENOUS at 13:11

## 2025-02-13 RX ADMIN — SODIUM CHLORIDE 20 ML/HR: 9 INJECTION, SOLUTION INTRAVENOUS at 13:11

## 2025-02-23 DIAGNOSIS — E55.9 VITAMIN D DEFICIENCY: ICD-10-CM

## 2025-02-24 ENCOUNTER — OFFICE VISIT (OUTPATIENT)
Dept: INTERNAL MEDICINE CLINIC | Facility: CLINIC | Age: 45
End: 2025-02-24
Payer: COMMERCIAL

## 2025-02-24 VITALS
HEIGHT: 62 IN | TEMPERATURE: 98.2 F | RESPIRATION RATE: 18 BRPM | OXYGEN SATURATION: 99 % | WEIGHT: 122 LBS | DIASTOLIC BLOOD PRESSURE: 60 MMHG | HEART RATE: 87 BPM | BODY MASS INDEX: 22.45 KG/M2 | SYSTOLIC BLOOD PRESSURE: 90 MMHG

## 2025-02-24 DIAGNOSIS — I89.0 LYMPHEDEMA OF RIGHT UPPER EXTREMITY: ICD-10-CM

## 2025-02-24 DIAGNOSIS — D50.9 IRON DEFICIENCY ANEMIA, UNSPECIFIED IRON DEFICIENCY ANEMIA TYPE: ICD-10-CM

## 2025-02-24 DIAGNOSIS — E55.9 VITAMIN D DEFICIENCY: ICD-10-CM

## 2025-02-24 DIAGNOSIS — Z00.00 ANNUAL PHYSICAL EXAM: Primary | ICD-10-CM

## 2025-02-24 DIAGNOSIS — R20.2 PARESTHESIA OF ARM: ICD-10-CM

## 2025-02-24 DIAGNOSIS — C01 CARCINOMA OF BASE OF TONGUE (HCC): ICD-10-CM

## 2025-02-24 DIAGNOSIS — E03.9 HYPOTHYROIDISM (ACQUIRED): ICD-10-CM

## 2025-02-24 DIAGNOSIS — R22.1 NECK SWELLING: ICD-10-CM

## 2025-02-24 DIAGNOSIS — Z82.49 FAMILY HISTORY OF CORONARY ARTERY DISEASE: ICD-10-CM

## 2025-02-24 PROCEDURE — 99213 OFFICE O/P EST LOW 20 MIN: CPT | Performed by: INTERNAL MEDICINE

## 2025-02-24 PROCEDURE — 99396 PREV VISIT EST AGE 40-64: CPT | Performed by: INTERNAL MEDICINE

## 2025-02-24 RX ORDER — ERGOCALCIFEROL 1.25 MG/1
50000 CAPSULE ORAL 2 TIMES WEEKLY
Qty: 14 CAPSULE | Refills: 0 | Status: SHIPPED | OUTPATIENT
Start: 2025-02-24 | End: 2025-04-11

## 2025-02-24 RX ORDER — ERGOCALCIFEROL 1.25 MG/1
CAPSULE, LIQUID FILLED ORAL
Qty: 14 CAPSULE | Refills: 0 | OUTPATIENT
Start: 2025-02-24

## 2025-02-24 NOTE — ASSESSMENT & PLAN NOTE
Orders:  •  ergocalciferol (ERGOCALCIFEROL) 1.25 MG (95078 UT) capsule; Take 1 capsule (50,000 Units total) by mouth 2 (two) times a week for 14 doses

## 2025-02-24 NOTE — PATIENT INSTRUCTIONS
"Patient Education     Routine physical for adults   The Basics   Written by the doctors and editors at [facility]   What is a physical? -- A physical is a routine visit, or \"check-up,\" with your doctor. You might also hear it called a \"wellness visit\" or \"preventive visit.\"  During each visit, the doctor will:   Ask about your physical and mental health   Ask about your habits, behaviors, and lifestyle   Do an exam   Give you vaccines if needed   Talk to you about any medicines you take   Give advice about your health   Answer your questions  Getting regular check-ups is an important part of taking care of your health. It can help your doctor find and treat any problems you have. But it's also important for preventing health problems.  A routine physical is different from a \"sick visit.\" A sick visit is when you see a doctor because of a health concern or problem. Since physicals are scheduled ahead of time, you can think about what you want to ask the doctor.  How often should I get a physical? -- It depends on your age and health. In general, for people age 21 years and older:   If you are younger than 50 years, you might be able to get a physical every 3 years.   If you are 50 years or older, your doctor might recommend a physical every year.  If you have an ongoing health condition, like diabetes or high blood pressure, your doctor will probably want to see you more often.  What happens during a physical? -- In general, each visit will include:   Physical exam - The doctor or nurse will check your height, weight, heart rate, and blood pressure. They will also look at your eyes and ears. They will ask about how you are feeling and whether you have any symptoms that bother you.   Medicines - It's a good idea to bring a list of all the medicines you take to each doctor visit. Your doctor will talk to you about your medicines and answer any questions. Tell them if you are having any side effects that bother you. You " "should also tell them if you are having trouble paying for any of your medicines.   Habits and behaviors - This includes:   Your diet   Your exercise habits   Whether you smoke, drink alcohol, or use drugs   Whether you are sexually active   Whether you feel safe at home  Your doctor will talk to you about things you can do to improve your health and lower your risk of health problems. They will also offer help and support. For example, if you want to quit smoking, they can give you advice and might prescribe medicines. If you want to improve your diet or get more physical activity, they can help you with this, too.   Lab tests, if needed - The tests you get will depend on your age and situation. For example, your doctor might want to check your:   Cholesterol   Blood sugar   Iron level   Vaccines - The recommended vaccines will depend on your age, health, and what vaccines you already had. Vaccines are very important because they can prevent certain serious or deadly infections.   Discussion of screening - \"Screening\" means checking for diseases or other health problems before they cause symptoms. Your doctor can recommend screening based on your age, risk, and preferences. This might include tests to check for:   Cancer, such as breast, prostate, cervical, ovarian, colorectal, prostate, lung, or skin cancer   Sexually transmitted infections, such as chlamydia and gonorrhea   Mental health conditions like depression and anxiety  Your doctor will talk to you about the different types of screening tests. They can help you decide which screenings to have. They can also explain what the results might mean.   Answering questions - The physical is a good time to ask the doctor or nurse questions about your health. If needed, they can refer you to other doctors or specialists, too.  Adults older than 65 years often need other care, too. As you get older, your doctor will talk to you about:   How to prevent falling at " home   Hearing or vision tests   Memory testing   How to take your medicines safely   Making sure that you have the help and support you need at home  All topics are updated as new evidence becomes available and our peer review process is complete.  This topic retrieved from [employer] on: May 02, 2024.  Topic 975275 Version 1.0  Release: 32.4.3 - C32.122  © 2024 UpToDate, Inc. and/or its affiliates. All rights reserved.  Consumer Information Use and Disclaimer   Disclaimer: This generalized information is a limited summary of diagnosis, treatment, and/or medication information. It is not meant to be comprehensive and should be used as a tool to help the user understand and/or assess potential diagnostic and treatment options. It does NOT include all information about conditions, treatments, medications, side effects, or risks that may apply to a specific patient. It is not intended to be medical advice or a substitute for the medical advice, diagnosis, or treatment of a health care provider based on the health care provider's examination and assessment of a patient's specific and unique circumstances. Patients must speak with a health care provider for complete information about their health, medical questions, and treatment options, including any risks or benefits regarding use of medications. This information does not endorse any treatments or medications as safe, effective, or approved for treating a specific patient. UpToDate, Inc. and its affiliates disclaim any warranty or liability relating to this information or the use thereof.The use of this information is governed by the Terms of Use, available at https://www.[employer]su[employer].com/en/know/clinical-effectiveness-terms. 2024© UpToDate, Inc. and its affiliates and/or licensors. All rights reserved.  Copyright   © 2024 UpToDate, Inc. and/or its affiliates. All rights reserved.

## 2025-02-24 NOTE — ASSESSMENT & PLAN NOTE
Maintained on levothyroxine  To go for repeat TSH    Further management per endocrinology  Orders:  •  Comprehensive metabolic panel; Future  •  Hemoglobin A1C; Future

## 2025-02-24 NOTE — ASSESSMENT & PLAN NOTE
Previously evaluated for right anterior neck swelling.  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  Status post tongue base excision and right neck dissection June 2024.  Had follow-up transoral robotic assisted base of tongue resection August 2024 and later completed postop radiation November 2024.  She is following with ENT, radiation oncology, medical oncology  -Right thyroid nodule versus thyroiditis noted January 2025 on thyroid ultrasound.  Repeat thyroid ultrasound in 6 months to evaluate for ENT  -Ongoing issues with dry mouth, manages with frequent hydration and Biotene  -Associated lymphedema of the neck and right upper extremity area.  Referred to PT/OT for lymphedema treatment    Orders:  •  Ambulatory Referral to PT/OT Lymphedema Therapy; Future

## 2025-02-24 NOTE — PROGRESS NOTES
Adult Annual Physical  Name: Aicha Kaminski      : 1980      MRN: [id number]  Encounter Provider: Marco Perez DO  Encounter Date: 2025   Encounter department: [facility] [place]    Medical history of menorrhagia, uterine fibroid, status post excision of endometrioma of  scar incision, ovarian cyst, B12 deficiency, iron deficiency, hypothyroidism, secretory carcinoma     Assessment & Plan  Annual physical exam         Iron deficiency anemia, unspecified iron deficiency anemia type    Orders:  •  CBC and differential; Future  •  Iron Panel (Includes Ferritin, Iron Sat%, Iron, and TIBC); Future    Vitamin D deficiency    Orders:  •  ergocalciferol (ERGOCALCIFEROL) 1.25 MG (13811 UT) capsule; Take 1 capsule (50,000 Units total) by mouth 2 (two) times a week for 14 doses    Lymphedema of right upper extremity    Orders:  •  Ambulatory Referral to PT/OT Lymphedema Therapy; Future    Neck swelling    Orders:  •  Ambulatory Referral to PT/OT Lymphedema Therapy; Future    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  Status post tongue base excision and right neck dissection 2024.  Had follow-up transoral robotic assisted base of tongue resection 2024 and later completed postop radiation 2024.  She is following with ENT, radiation oncology, medical oncology  -Right thyroid nodule versus thyroiditis noted 2025 on thyroid ultrasound.  Repeat thyroid ultrasound in 6 months to evaluate for ENT  -Ongoing issues with dry mouth, manages with frequent hydration and Biotene  -Associated lymphedema of the neck and right upper extremity area.  Referred to PT/OT for lymphedema treatment    Orders:  •  Ambulatory Referral to PT/OT Lymphedema Therapy;  Future    Family history of coronary artery disease  Reports family history of CAD with CABG.  We will add on LP(a) and apolipoprotein B to lipid panel for further risk stratification purposes  Orders:  •  Lipid Panel with Direct LDL reflex; Future  •  Apolipoprotein B; Future  •  Lipoprotein A (LPA); Future    Hypothyroidism (acquired)  Maintained on levothyroxine  To go for repeat TSH    Further management per endocrinology  Orders:  •  Comprehensive metabolic panel; Future  •  Hemoglobin A1C; Future    Paresthesia of arm  Onset of vibration station approximately 1 week ago affecting the right upper and right lower extremity.  No significant pain.  She did complete radiation therapy for history of secretory carcinoma of the neck relatively recently as outlined elsewhere and has associated lymphedema of the right upper extremity.  Does not appear to have any gross strength deficits.  She has been prescribed Lyrica for neuropathic pain    Plan:  -Etiology of symptoms unclear.  Possibly related to recent radiation treatments though not certain this would explain her lower extremity symptoms  -For now, recommend clinical monitoring.  She will be going for soft tissue MRI of the neck in several months and I will add on an MRI of the cervical spine  -If symptoms persist or worsen in severity, could also consider EMG for characterization    Orders:  •  MRI cervical spine wo contrast; Future      Immunizations and preventive care screenings were discussed with patient today. Appropriate education was printed on patient's after visit summary.    Counseling:  Dental Health: discussed importance of regular tooth brushing, flossing, and dental visits.  Exercise: the importance of regular exercise/physical activity was discussed. Recommend exercise 3-5 times per week for at least 30 minutes.          History of Present Illness     Adult Annual Physical:  Patient presents for annual physical.     Depression Screening:  - PHQ-2  "Score: 0    General Health:    - Hearing: normal hearing right ear and normal hearing left ear.  - Vision: no vision problems.  - Dental: regular dental visits.    /GYN Health:  - Follows with GYN: yes.     Review of Systems      Objective   BP 90/60 (BP Location: Left arm, Patient Position: Sitting, Cuff Size: Standard)   Pulse 87   Temp 98.2 °F (36.8 °C) (Temporal)   Resp 18   Ht 5' 1.5\" (1.562 m)   Wt 55.3 kg (122 lb)   SpO2 99%   BMI 22.68 kg/m²     Physical Exam  Constitutional:       Appearance: Normal appearance. She is not ill-appearing.   HENT:      Head: Normocephalic and atraumatic.   Eyes:      General: No scleral icterus.        Right eye: No discharge.         Left eye: No discharge.   Cardiovascular:      Rate and Rhythm: Normal rate and regular rhythm.      Heart sounds: No murmur heard.     No friction rub.   Pulmonary:      Effort: Pulmonary effort is normal.      Breath sounds: Normal breath sounds. No wheezing or rales.   Abdominal:      General: Abdomen is flat. There is no distension.      Palpations: Abdomen is soft.      Tenderness: There is no abdominal tenderness.   Musculoskeletal:         General: No swelling, tenderness or deformity.   Skin:     General: Skin is warm and dry.      Findings: No erythema.      Comments: Right anterior neck fullness appreciated   Neurological:      Mental Status: She is alert and oriented to person, place, and time. Mental status is at baseline.      Motor: No weakness.   Psychiatric:         Mood and Affect: Mood normal.         Behavior: Behavior normal.         "

## 2025-02-26 ENCOUNTER — TELEPHONE (OUTPATIENT)
Age: 45
End: 2025-02-26

## 2025-02-26 ENCOUNTER — APPOINTMENT (OUTPATIENT)
Dept: LAB | Facility: CLINIC | Age: 45
End: 2025-02-26
Payer: COMMERCIAL

## 2025-02-26 DIAGNOSIS — E03.9 HYPOTHYROIDISM, UNSPECIFIED TYPE: ICD-10-CM

## 2025-02-26 DIAGNOSIS — D53.9 NUTRITIONAL ANEMIA: Primary | ICD-10-CM

## 2025-02-26 LAB — TSH SERPL DL<=0.05 MIU/L-ACNC: 1 UIU/ML (ref 0.45–4.5)

## 2025-02-26 PROCEDURE — 36415 COLL VENOUS BLD VENIPUNCTURE: CPT

## 2025-02-26 PROCEDURE — 84443 ASSAY THYROID STIM HORMONE: CPT

## 2025-02-26 RX ORDER — SODIUM CHLORIDE 9 MG/ML
20 INJECTION, SOLUTION INTRAVENOUS ONCE
Status: CANCELLED | OUTPATIENT
Start: 2025-03-03

## 2025-02-27 ENCOUNTER — RESULTS FOLLOW-UP (OUTPATIENT)
Dept: ENDOCRINOLOGY | Facility: CLINIC | Age: 45
End: 2025-02-27

## 2025-03-03 ENCOUNTER — HOSPITAL ENCOUNTER (OUTPATIENT)
Dept: INFUSION CENTER | Facility: HOSPITAL | Age: 45
Discharge: HOME | End: 2025-03-03
Attending: INTERNAL MEDICINE
Payer: COMMERCIAL

## 2025-03-03 VITALS
HEART RATE: 58 BPM | DIASTOLIC BLOOD PRESSURE: 69 MMHG | RESPIRATION RATE: 18 BRPM | TEMPERATURE: 97.6 F | SYSTOLIC BLOOD PRESSURE: 100 MMHG | OXYGEN SATURATION: 96 %

## 2025-03-03 DIAGNOSIS — D53.9 NUTRITIONAL ANEMIA: Primary | ICD-10-CM

## 2025-03-03 PROCEDURE — 96366 THER/PROPH/DIAG IV INF ADDON: CPT

## 2025-03-03 PROCEDURE — 96365 THER/PROPH/DIAG IV INF INIT: CPT

## 2025-03-03 RX ORDER — SODIUM CHLORIDE 9 MG/ML
20 INJECTION, SOLUTION INTRAVENOUS ONCE
Status: CANCELLED | OUTPATIENT
Start: 2025-03-03

## 2025-03-03 RX ORDER — SODIUM CHLORIDE 9 MG/ML
20 INJECTION, SOLUTION INTRAVENOUS ONCE
Status: COMPLETED | OUTPATIENT
Start: 2025-03-03 | End: 2025-03-03

## 2025-03-03 RX ADMIN — SODIUM CHLORIDE 20 ML/HR: 0.9 INJECTION, SOLUTION INTRAVENOUS at 10:20

## 2025-03-03 RX ADMIN — IRON SUCROSE 300 MG: 20 INJECTION, SOLUTION INTRAVENOUS at 10:20

## 2025-03-03 NOTE — PROGRESS NOTES
Aicha Kaminski  tolerated treatment well with no complications.      Aicha Kaminski is aware of lab work to complete in 3 weeks prior to follow up.     AVS printed and given to Aicha Kaminski:  No (Declined by Aicha Kaminski)

## 2025-04-15 ENCOUNTER — TELEPHONE (OUTPATIENT)
Dept: HEMATOLOGY ONCOLOGY | Facility: CLINIC | Age: 45
End: 2025-04-15

## 2025-04-15 DIAGNOSIS — E53.8 B12 DEFICIENCY: ICD-10-CM

## 2025-04-15 DIAGNOSIS — D53.9 NUTRITIONAL ANEMIA: ICD-10-CM

## 2025-04-15 DIAGNOSIS — D50.0 IRON DEFICIENCY ANEMIA DUE TO CHRONIC BLOOD LOSS: Primary | ICD-10-CM

## 2025-04-15 DIAGNOSIS — D50.9 IRON DEFICIENCY ANEMIA, UNSPECIFIED IRON DEFICIENCY ANEMIA TYPE: ICD-10-CM

## 2025-04-15 NOTE — TELEPHONE ENCOUNTER
Spoke with patient as a reminder to complete lab work prior to appointment with Kim on 4/22. Stated new orders have been placed and they are non-fasting. Patient verbalized understanding and is in agreement with the plan.

## 2025-04-16 ENCOUNTER — APPOINTMENT (OUTPATIENT)
Dept: LAB | Facility: CLINIC | Age: 45
End: 2025-04-16
Payer: COMMERCIAL

## 2025-04-16 DIAGNOSIS — E53.8 B12 DEFICIENCY: ICD-10-CM

## 2025-04-16 DIAGNOSIS — D53.9 NUTRITIONAL ANEMIA: ICD-10-CM

## 2025-04-16 DIAGNOSIS — D50.0 IRON DEFICIENCY ANEMIA DUE TO CHRONIC BLOOD LOSS: ICD-10-CM

## 2025-04-16 DIAGNOSIS — D50.9 IRON DEFICIENCY ANEMIA, UNSPECIFIED IRON DEFICIENCY ANEMIA TYPE: ICD-10-CM

## 2025-04-16 LAB
BASOPHILS # BLD AUTO: 0.02 THOUSANDS/ÂΜL (ref 0–0.1)
BASOPHILS NFR BLD AUTO: 1 % (ref 0–1)
EOSINOPHIL # BLD AUTO: 0.14 THOUSAND/ÂΜL (ref 0–0.61)
EOSINOPHIL NFR BLD AUTO: 4 % (ref 0–6)
ERYTHROCYTE [DISTWIDTH] IN BLOOD BY AUTOMATED COUNT: 13.3 % (ref 11.6–15.1)
FERRITIN SERPL-MCNC: 100 NG/ML (ref 30–307)
HCT VFR BLD AUTO: 41.2 % (ref 34.8–46.1)
HGB BLD-MCNC: 13.6 G/DL (ref 11.5–15.4)
IMM GRANULOCYTES # BLD AUTO: 0.01 THOUSAND/UL (ref 0–0.2)
IMM GRANULOCYTES NFR BLD AUTO: 0 % (ref 0–2)
IRON SATN MFR SERPL: 27 % (ref 15–50)
IRON SERPL-MCNC: 84 UG/DL (ref 50–212)
LYMPHOCYTES # BLD AUTO: 0.73 THOUSANDS/ÂΜL (ref 0.6–4.47)
LYMPHOCYTES NFR BLD AUTO: 20 % (ref 14–44)
MCH RBC QN AUTO: 30.2 PG (ref 26.8–34.3)
MCHC RBC AUTO-ENTMCNC: 33 G/DL (ref 31.4–37.4)
MCV RBC AUTO: 91 FL (ref 82–98)
MONOCYTES # BLD AUTO: 0.28 THOUSAND/ÂΜL (ref 0.17–1.22)
MONOCYTES NFR BLD AUTO: 8 % (ref 4–12)
NEUTROPHILS # BLD AUTO: 2.47 THOUSANDS/ÂΜL (ref 1.85–7.62)
NEUTS SEG NFR BLD AUTO: 67 % (ref 43–75)
NRBC BLD AUTO-RTO: 0 /100 WBCS
PLATELET # BLD AUTO: 257 THOUSANDS/UL (ref 149–390)
PMV BLD AUTO: 11.3 FL (ref 8.9–12.7)
RBC # BLD AUTO: 4.51 MILLION/UL (ref 3.81–5.12)
TIBC SERPL-MCNC: 308 UG/DL (ref 250–450)
TRANSFERRIN SERPL-MCNC: 220 MG/DL (ref 203–362)
UIBC SERPL-MCNC: 224 UG/DL (ref 155–355)
WBC # BLD AUTO: 3.65 THOUSAND/UL (ref 4.31–10.16)

## 2025-04-16 PROCEDURE — 83550 IRON BINDING TEST: CPT

## 2025-04-16 PROCEDURE — 83540 ASSAY OF IRON: CPT

## 2025-04-16 PROCEDURE — 36415 COLL VENOUS BLD VENIPUNCTURE: CPT

## 2025-04-16 PROCEDURE — 85025 COMPLETE CBC W/AUTO DIFF WBC: CPT

## 2025-04-16 PROCEDURE — 82728 ASSAY OF FERRITIN: CPT

## 2025-04-20 DIAGNOSIS — E55.9 VITAMIN D DEFICIENCY: ICD-10-CM

## 2025-04-21 RX ORDER — ERGOCALCIFEROL 1.25 MG/1
CAPSULE, LIQUID FILLED ORAL
Qty: 14 CAPSULE | Refills: 0 | Status: SHIPPED | OUTPATIENT
Start: 2025-04-21

## 2025-04-22 ENCOUNTER — TELEMEDICINE (OUTPATIENT)
Dept: HEMATOLOGY ONCOLOGY | Facility: CLINIC | Age: 45
End: 2025-04-22
Payer: COMMERCIAL

## 2025-04-22 DIAGNOSIS — E53.8 B12 DEFICIENCY: ICD-10-CM

## 2025-04-22 DIAGNOSIS — E53.8 FOLATE DEFICIENCY: ICD-10-CM

## 2025-04-22 DIAGNOSIS — D50.0 IRON DEFICIENCY ANEMIA DUE TO CHRONIC BLOOD LOSS: Primary | ICD-10-CM

## 2025-04-22 PROCEDURE — 99213 OFFICE O/P EST LOW 20 MIN: CPT

## 2025-04-22 NOTE — PROGRESS NOTES
Virtual Regular VisitName: Aicha Kaminski      : 1980      MRN: [id number]  Encounter Provider: TABATHA French  Encounter Date: 2025   Encounter department: [facility] [place]  :  Assessment & Plan  Iron deficiency anemia due to chronic blood loss    Orders:    CBC and differential; Future    Iron Panel (Includes Ferritin, Iron Sat%, Iron, and TIBC); Future    B12 deficiency    Orders:    Vitamin B12; Future    Folate deficiency    Orders:    Folate; Future    44-year-old female with history of iron deficiency anemia.  At this time, her counts are stable therefore recommend she continue taking the ferrous sulfate solution.  Patient is concerned about her leukopenia, this may be her new normal after treatment.  As long as her differential is within the normal limits, there is no  concern about her leukopenia.    We will have a short-term follow-up of 3 months with labs prior.  Patient is agreeable with this plan.  She is aware to contact us for any additional questions/concerns or worsening symptoms.    History of Present Illness     Aicha Kaminski was seen for initial consultation 2024 regarding iron deficiency anemia.  Patient has PMH significant for hypothyroidism.     Patient had a CT of soft neck tissue for an enlarged lymph nodes in the jugular digastric region.  She had a fine-needle on 3/13/2024, which was gated for malignancy.  There was no improvement in the enlarged lymph node therefore, patient underwent a core biopsy on 5/15/2024, results are pending.  Patient denies any fevers, frequent infections, decreased appetite or unintentional weight loss.  No drenching night sweats.     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  Patient is status post RT, 33  fractions about 6 weeks ago.  She was treated at [facility].  She will have a follow up scan in February.     Patient has a long history of iron deficiency anemia.  She is noted to be iron deficient since 2018.  She is status post IV iron treatments through her hematologist in [place].       Patient has a monthly menstrual cycle which last between 4 to 6 days, with the last 2 days of spotting.  Her second and third day are the heaviest where she has to change her pad/tampon 6-8 times a day.    Patient is status post IV iron treatment, Venofer 300 mg x 4 doses (6/14/2024 to 7/12/2024).  Since then she has been in treatment for her newly diagnosed secretory carcinoma of the right base of tongue.  She is not taking iron supplements.    4/22/2025: Patient presents for follow-up for iron deficiency anemia.  She is taking oral iron solution, 15 mg (1 mL) at least 5 times a week.  She reports that her menstrual cycle is getting lighter, where is only lasting 4 days.    Patient is status post IV iron treatment, Venofer 300 mg x 4 doses (1/15/2025 to 3/13/2025).  We were doing these every 2 weeks due to poor peripheral access.    Patient reports she is doing much better.  She continues to have fatigue however, it is slowly improving.  Denies any dizziness, lightheadedness, increased frequency of headaches or abnormal bleeding.    4/16/2025: WBC 3.65, ANC 2.47, Hgb 13.6, MCV 91, platelets 257,000, serum iron 84, iron saturation 27%, ferritin 100              Objective   There were no vitals taken for this visit.        Administrative Statements   Encounter provider TABATHA French    The Patient is located at Home and in the following state in which I hold an active license [place].    The patient was identified by name and date of birth. Aicha Yamilex was informed that this is a telemedicine visit and that the visit is being conducted through the Epic Embedded platform. She agrees to proceed..  My office  door was closed. No one else was in the room.  She acknowledged consent and understanding of privacy and security of the video platform. The patient has agreed to participate and understands they can discontinue the visit at any time.    I have spent a total time of 20 minutes in caring for this patient on the day of the visit/encounter including chart review, counseling, coordination of care, and documentation; not including the time spent for establishing the audio/video connection.

## 2025-05-08 ENCOUNTER — TELEPHONE (OUTPATIENT)
Age: 45
End: 2025-05-08

## 2025-06-20 ENCOUNTER — TELEPHONE (OUTPATIENT)
Dept: ENDOCRINOLOGY | Facility: CLINIC | Age: 45
End: 2025-06-20

## 2025-06-20 DIAGNOSIS — E03.9 HYPOTHYROIDISM, UNSPECIFIED TYPE: Primary | ICD-10-CM

## 2025-06-20 NOTE — TELEPHONE ENCOUNTER
Patient came in asking what blood work needs to be done. I did not see any in chart. Please advise.

## 2025-07-17 ENCOUNTER — APPOINTMENT (OUTPATIENT)
Dept: LAB | Facility: CLINIC | Age: 45
End: 2025-07-17
Payer: COMMERCIAL

## 2025-07-17 DIAGNOSIS — E03.9 HYPOTHYROIDISM, UNSPECIFIED TYPE: ICD-10-CM

## 2025-07-17 DIAGNOSIS — D50.0 IRON DEFICIENCY ANEMIA DUE TO CHRONIC BLOOD LOSS: ICD-10-CM

## 2025-07-17 DIAGNOSIS — E53.8 B12 DEFICIENCY: ICD-10-CM

## 2025-07-17 DIAGNOSIS — E53.8 FOLATE DEFICIENCY: ICD-10-CM

## 2025-07-17 LAB
BASOPHILS # BLD AUTO: 0.02 THOUSANDS/ÂΜL (ref 0–0.1)
BASOPHILS NFR BLD AUTO: 1 % (ref 0–1)
EOSINOPHIL # BLD AUTO: 0.12 THOUSAND/ÂΜL (ref 0–0.61)
EOSINOPHIL NFR BLD AUTO: 5 % (ref 0–6)
ERYTHROCYTE [DISTWIDTH] IN BLOOD BY AUTOMATED COUNT: 12.3 % (ref 11.6–15.1)
FERRITIN SERPL-MCNC: 50 NG/ML (ref 30–307)
FOLATE SERPL-MCNC: 12.3 NG/ML
HCT VFR BLD AUTO: 39.6 % (ref 34.8–46.1)
HGB BLD-MCNC: 12.8 G/DL (ref 11.5–15.4)
IMM GRANULOCYTES # BLD AUTO: 0 THOUSAND/UL (ref 0–0.2)
IMM GRANULOCYTES NFR BLD AUTO: 0 % (ref 0–2)
IRON SATN MFR SERPL: 24 % (ref 15–50)
IRON SERPL-MCNC: 81 UG/DL (ref 50–212)
LYMPHOCYTES # BLD AUTO: 0.59 THOUSANDS/ÂΜL (ref 0.6–4.47)
LYMPHOCYTES NFR BLD AUTO: 24 % (ref 14–44)
MCH RBC QN AUTO: 29.5 PG (ref 26.8–34.3)
MCHC RBC AUTO-ENTMCNC: 32.3 G/DL (ref 31.4–37.4)
MCV RBC AUTO: 91 FL (ref 82–98)
MONOCYTES # BLD AUTO: 0.2 THOUSAND/ÂΜL (ref 0.17–1.22)
MONOCYTES NFR BLD AUTO: 8 % (ref 4–12)
NEUTROPHILS # BLD AUTO: 1.54 THOUSANDS/ÂΜL (ref 1.85–7.62)
NEUTS SEG NFR BLD AUTO: 62 % (ref 43–75)
NRBC BLD AUTO-RTO: 0 /100 WBCS
PLATELET # BLD AUTO: 226 THOUSANDS/UL (ref 149–390)
PMV BLD AUTO: 11.4 FL (ref 8.9–12.7)
RBC # BLD AUTO: 4.34 MILLION/UL (ref 3.81–5.12)
T4 FREE SERPL-MCNC: 0.92 NG/DL (ref 0.61–1.12)
TIBC SERPL-MCNC: 331.8 UG/DL (ref 250–450)
TRANSFERRIN SERPL-MCNC: 237 MG/DL (ref 203–362)
TSH SERPL DL<=0.05 MIU/L-ACNC: 1.68 UIU/ML (ref 0.45–4.5)
UIBC SERPL-MCNC: 251 UG/DL (ref 155–355)
VIT B12 SERPL-MCNC: 284 PG/ML (ref 180–914)
WBC # BLD AUTO: 2.47 THOUSAND/UL (ref 4.31–10.16)

## 2025-07-17 PROCEDURE — 83540 ASSAY OF IRON: CPT

## 2025-07-17 PROCEDURE — 36415 COLL VENOUS BLD VENIPUNCTURE: CPT

## 2025-07-17 PROCEDURE — 83550 IRON BINDING TEST: CPT

## 2025-07-17 PROCEDURE — 82607 VITAMIN B-12: CPT

## 2025-07-17 PROCEDURE — 84439 ASSAY OF FREE THYROXINE: CPT

## 2025-07-17 PROCEDURE — 84443 ASSAY THYROID STIM HORMONE: CPT

## 2025-07-17 PROCEDURE — 85025 COMPLETE CBC W/AUTO DIFF WBC: CPT

## 2025-07-17 PROCEDURE — 82728 ASSAY OF FERRITIN: CPT

## 2025-07-17 PROCEDURE — 82746 ASSAY OF FOLIC ACID SERUM: CPT

## 2025-07-22 ENCOUNTER — TELEMEDICINE (OUTPATIENT)
Dept: HEMATOLOGY ONCOLOGY | Facility: CLINIC | Age: 45
End: 2025-07-22
Payer: COMMERCIAL

## 2025-07-22 ENCOUNTER — TELEPHONE (OUTPATIENT)
Dept: HEMATOLOGY ONCOLOGY | Facility: CLINIC | Age: 45
End: 2025-07-22

## 2025-07-22 DIAGNOSIS — R79.0 LOW FERRITIN: Primary | ICD-10-CM

## 2025-07-22 DIAGNOSIS — D70.9 NEUTROPENIA, UNSPECIFIED TYPE (HCC): ICD-10-CM

## 2025-07-22 DIAGNOSIS — N92.1 MENORRHAGIA WITH IRREGULAR CYCLE: ICD-10-CM

## 2025-07-22 DIAGNOSIS — E53.8 FOLATE DEFICIENCY: ICD-10-CM

## 2025-07-22 DIAGNOSIS — E53.8 VITAMIN B12 DEFICIENCY: ICD-10-CM

## 2025-07-22 PROBLEM — N92.0 MENORRHAGIA: Status: ACTIVE | Noted: 2025-07-22

## 2025-07-22 PROCEDURE — 99214 OFFICE O/P EST MOD 30 MIN: CPT

## 2025-07-22 RX ORDER — CYANOCOBALAMIN 1000 UG/ML
1000 INJECTION, SOLUTION INTRAMUSCULAR; SUBCUTANEOUS WEEKLY
Qty: 7 ML | Refills: 0 | Status: SHIPPED | OUTPATIENT
Start: 2025-07-22 | End: 2025-09-03

## 2025-07-22 RX ORDER — SYRINGE W-NEEDLE,DISPOSAB,3 ML 25GX5/8"
SYRINGE, EMPTY DISPOSABLE MISCELLANEOUS WEEKLY
Qty: 1 EACH | Refills: 0 | Status: SHIPPED | OUTPATIENT
Start: 2025-07-22

## 2025-07-22 RX ORDER — SODIUM CHLORIDE 9 MG/ML
20 INJECTION, SOLUTION INTRAVENOUS ONCE
OUTPATIENT
Start: 2025-08-07

## 2025-07-22 RX ORDER — CYANOCOBALAMIN 1000 UG/ML
1000 INJECTION, SOLUTION INTRAMUSCULAR; SUBCUTANEOUS ONCE
OUTPATIENT
Start: 2025-08-07 | End: 2025-07-22

## 2025-07-22 NOTE — ASSESSMENT & PLAN NOTE
"  Orders:    Vitamin B12; Future    cyanocobalamin 1,000 mcg/mL; Inject 1 mL (1,000 mcg total) into a muscle once a week for 7 doses    Syringe/Needle, Disp, (SYRINGE 3CC/25GX1\") 25G X 1\" 3 ML MISC; Use once a week    Patient's vitamin B12 level is on the low end of normal, which is likely contributing to her neutropenia.  Since her radiation oncologist does not want her taking any oral supplements, we will proceed with weekly B12 injections x 8 doses.  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  "

## 2025-07-22 NOTE — ASSESSMENT & PLAN NOTE
Orders:    CBC and differential; Future    Iron Panel (Includes Ferritin, Iron Sat%, Iron, and TIBC); Future    We discussed due to her menorrhagia, ferritin dropped in half from 100-50 in 3 months.  At this time, will be reasonable due to her symptoms and menorrhagia we initiated maintenance IV iron plan of Venofer 300 mg x 1 dose every 84 days (3 months) plus IM B12 injection.  The week she gets the IM B12 injection at the infusion center, she will not take this injection at home.

## 2025-07-22 NOTE — PROGRESS NOTES
"Virtual Regular Visit  Name: Aicha Kaminski      : 1980      MRN: [id number]  Encounter Provider: TABATHA French  Encounter Date: 2025   Encounter department: [facility] [place]  :  Assessment & Plan  Vitamin B12 deficiency    Orders:    Vitamin B12; Future    cyanocobalamin 1,000 mcg/mL; Inject 1 mL (1,000 mcg total) into a muscle once a week for 7 doses    Syringe/Needle, Disp, (SYRINGE 3CC/25GX1\") 25G X 1\" 3 ML MISC; Use once a week    Patient's vitamin B12 level is on the low end of normal, which is likely contributing to her neutropenia.  Since her radiation oncologist does not want her taking any oral supplements, we will proceed with weekly B12 injections x 8 doses.  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  Low ferritin    Orders:    CBC and differential; Future    Iron Panel (Includes Ferritin, Iron Sat%, Iron, and TIBC); Future    We discussed due to her menorrhagia, ferritin dropped in half from 100-50 in 3 months.  At this time, will be reasonable due to her symptoms and menorrhagia we initiated maintenance IV iron plan of Venofer 300 mg x 1 dose every 84 days (3 months) plus IM B12 injection.  The week she gets the IM B12 injection at the infusion center, she will not take this injection at home.  Menorrhagia with irregular cycle    Orders:    CBC and differential; Future    Iron Panel (Includes Ferritin, Iron Sat%, Iron, and TIBC); Future    See above.  Recommend she follow-up with her OB/GYN.  We discussed the irregular menstrual cycles may likely be secondary to perimenopause versus fibroids?  Given she check with her radiation oncologist if she is able to proceed with a pelvic ultrasound prior to seeing her OB/GYN.  Neutropenia, unspecified type (HCC)    Orders:    CBC and differential; Future    Patient' noted to have persistent leukopenia, we had previously discussed that this may be her new " normal due to her radiation treatment.  However, her ANC is also low with her recent labs.  Discussed her folate is on the low end of normal and her vitamin B12 is also on the low end of normal.  Nutritional deficiencies, such as vitamin B12 and folate, can contribute to neutropenia.  We will recheck her counts in 3 months once we replete her vitamin B12.  Folate deficiency    Orders:    Folate; Future  Patient is unable to take folic acid supplements, recommend she focus on folate which foods.  Lots of vegetables to have folate in them.    We will see patient back in the office with a short-term follow-up of 3 months with labs prior.  She is agreeable with this plan.  Patient aware to contact us for any additional questions/concerns or worsening symptoms.    History of Present Illness     Aicha Kaminski was seen for initial consultation 5/16/2024 regarding iron deficiency anemia.  She presents for follow-up today.  Patient has PMH significant for hypothyroidism.     Patient had a CT of soft neck tissue for an enlarged lymph nodes in the jugular digastric region.  She had a fine-needle on 3/13/2024, which was gated for malignancy.  There was no improvement in the enlarged lymph node therefore, patient underwent a core biopsy on 5/15/2024, results are pending.  Patient denies any fevers, frequent infections, decreased appetite or unintentional weight loss.  No drenching night sweats.     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  Patient is status post RT, 33 fractions about 6 weeks ago.  She was treated at [facility].  She will have a follow up scan in February.     Patient has a long history of iron deficiency anemia.  She is noted to be iron deficient since 2018.  She is status post IV iron treatments  through her hematologist in [place].       Patient is status post IV iron treatment, Venofer 300 mg x 4 doses (1/15/2025 to 3/13/2025). Patient reports she has been taking ferrous gluconate supplements daily.  She takes iron solution every once in a while.  She is also taking vitamin D3, which she takes at bedtime. Her radiation oncologist had her stop all additional vitamins for the time being.    Patient reports she is doing much better.  Her fatigue is still present however, it has not worsened.  Denies any dizziness, lightheadedness.    She reports her menstrual cycles have intermittently worsened where she is getting it every 2 weeks.  They last about 3 to 4 days but the second day being on the heavier side.    She did speak with her OB/GYN prior to starting treatment and was ordered a pelvic ultrasound however, was on hold as her radiation oncologist did not want her to have too many scans during her treatments.  Patient does have a history of endometrial lesions, left adnexal fibroid.    Patient's appetite is getting better however, she reports she is losing weight.  At her last in person office visit, she weighed 126 pounds.  Today, she reports that she has been averaging between 118 to 120 pounds.  She reports she is having difficulty with protein intake even with protein powder.  She uses 2 scoops in the morning however, does not like the taste.  This is approximately 30 g of protein.    Labs:  7/17/2025: Hgb 12.8, MCV 91, WBC 2.47, ANC 1.54, platelets 226,000, folate 12.3, serum iron 81, iron saturation 24%, ferritin 50    4/16/2025: WBC 3.65, ANC 2.47, Hgb 13.6, MCV 91, platelets 257,000, serum iron 84, iron saturation 27%, ferritin 100      Treatment:  Venofer 300 mg x 4 doses = 6/14/2024 to 7/12/2024  Venofer 300 mg x 4 doses = 1/15/2025 to 3/13/2025          Objective   There were no vitals taken for this visit.        Administrative Statements   Encounter provider TABATHA French    The  Patient is located at Home and in the following state in which I hold an active license [place].    The patient was identified by name and date of birth. Aicha Kaminski was informed that this is a telemedicine visit and that the visit is being conducted through the Epic Embedded platform. She agrees to proceed..  My office door was closed. No one else was in the room.  She acknowledged consent and understanding of privacy and security of the video platform. The patient has agreed to participate and understands they can discontinue the visit at any time.    I have spent a total time of 30 minutes in caring for this patient on the day of the visit/encounter including chart review, counseling, coordination of care, and documentation;  not including the time spent for establishing the audio/video connection.

## 2025-07-22 NOTE — ASSESSMENT & PLAN NOTE
Orders:    CBC and differential; Future    Iron Panel (Includes Ferritin, Iron Sat%, Iron, and TIBC); Future    See above.  Recommend she follow-up with her OB/GYN.  We discussed the irregular menstrual cycles may likely be secondary to perimenopause versus fibroids?  Given she check with her radiation oncologist if she is able to proceed with a pelvic ultrasound prior to seeing her OB/GYN.

## 2025-07-23 NOTE — ASSESSMENT & PLAN NOTE
Orders:    Folate; Future  Patient is unable to take folic acid supplements, recommend she focus on folate which foods.  Lots of vegetables to have folate in them.

## 2025-07-23 NOTE — ASSESSMENT & PLAN NOTE
Orders:    CBC and differential; Future    Patient' noted to have persistent leukopenia, we had previously discussed that this may be her new normal due to her radiation treatment.  However, her ANC is also low with her recent labs.  Discussed her folate is on the low end of normal and her vitamin B12 is also on the low end of normal.  Nutritional deficiencies, such as vitamin B12 and folate, can contribute to neutropenia.  We will recheck her counts in 3 months once we replete her vitamin B12.

## 2025-07-30 ENCOUNTER — OFFICE VISIT (OUTPATIENT)
Dept: ENDOCRINOLOGY | Facility: CLINIC | Age: 45
End: 2025-07-30
Payer: COMMERCIAL

## 2025-07-30 VITALS
OXYGEN SATURATION: 99 % | HEART RATE: 65 BPM | DIASTOLIC BLOOD PRESSURE: 78 MMHG | WEIGHT: 120.8 LBS | HEIGHT: 62 IN | SYSTOLIC BLOOD PRESSURE: 112 MMHG | BODY MASS INDEX: 22.23 KG/M2

## 2025-07-30 DIAGNOSIS — E55.9 VITAMIN D DEFICIENCY: ICD-10-CM

## 2025-07-30 DIAGNOSIS — E03.9 HYPOTHYROIDISM, UNSPECIFIED TYPE: ICD-10-CM

## 2025-07-30 DIAGNOSIS — R79.0 LOW FERRITIN: ICD-10-CM

## 2025-07-30 DIAGNOSIS — E03.9 HYPOTHYROIDISM (ACQUIRED): Primary | ICD-10-CM

## 2025-07-30 PROCEDURE — 99214 OFFICE O/P EST MOD 30 MIN: CPT | Performed by: INTERNAL MEDICINE

## 2025-07-30 RX ORDER — LEVOTHYROXINE SODIUM 88 UG/1
88 TABLET ORAL DAILY
Qty: 100 TABLET | Refills: 3 | Status: SHIPPED | OUTPATIENT
Start: 2025-07-30 | End: 2025-07-30 | Stop reason: SDUPTHER

## 2025-07-30 RX ORDER — METHYLPREDNISOLONE 4 MG/1
4 TABLET ORAL DAILY
COMMUNITY
Start: 2025-07-24

## 2025-07-30 RX ORDER — FLUTICASONE PROPIONATE 50 MCG
2 SPRAY, SUSPENSION (ML) NASAL 2 TIMES DAILY
COMMUNITY
Start: 2025-07-24

## 2025-07-30 RX ORDER — LEVOTHYROXINE SODIUM 88 UG/1
88 TABLET ORAL DAILY
Qty: 100 TABLET | Refills: 3 | Status: SHIPPED | OUTPATIENT
Start: 2025-07-30 | End: 2026-09-03

## 2025-08-07 ENCOUNTER — HOSPITAL ENCOUNTER (OUTPATIENT)
Dept: INFUSION CENTER | Facility: HOSPITAL | Age: 45
Discharge: HOME | End: 2025-08-07
Attending: INTERNAL MEDICINE
Payer: COMMERCIAL